# Patient Record
Sex: FEMALE | Race: OTHER | HISPANIC OR LATINO | URBAN - METROPOLITAN AREA
[De-identification: names, ages, dates, MRNs, and addresses within clinical notes are randomized per-mention and may not be internally consistent; named-entity substitution may affect disease eponyms.]

---

## 2017-07-29 ENCOUNTER — OUTPATIENT (OUTPATIENT)
Dept: OUTPATIENT SERVICES | Facility: HOSPITAL | Age: 82
LOS: 1 days | End: 2017-07-29
Payer: MEDICARE

## 2017-07-29 PROCEDURE — 70544 MR ANGIOGRAPHY HEAD W/O DYE: CPT

## 2017-07-29 PROCEDURE — 70544 MR ANGIOGRAPHY HEAD W/O DYE: CPT | Mod: 26

## 2017-08-17 ENCOUNTER — APPOINTMENT (OUTPATIENT)
Dept: NEUROSURGERY | Facility: CLINIC | Age: 82
End: 2017-08-17
Payer: MEDICARE

## 2017-08-17 VITALS
BODY MASS INDEX: 22.71 KG/M2 | WEIGHT: 133 LBS | HEIGHT: 64 IN | OXYGEN SATURATION: 98 % | SYSTOLIC BLOOD PRESSURE: 148 MMHG | DIASTOLIC BLOOD PRESSURE: 82 MMHG | HEART RATE: 79 BPM

## 2017-08-17 PROCEDURE — 99215 OFFICE O/P EST HI 40 MIN: CPT

## 2017-09-13 ENCOUNTER — APPOINTMENT (OUTPATIENT)
Dept: NEUROLOGY | Facility: CLINIC | Age: 82
End: 2017-09-13
Payer: MEDICARE

## 2017-09-13 VITALS
DIASTOLIC BLOOD PRESSURE: 75 MMHG | TEMPERATURE: 99 F | HEIGHT: 64 IN | OXYGEN SATURATION: 98 % | SYSTOLIC BLOOD PRESSURE: 153 MMHG | BODY MASS INDEX: 21.85 KG/M2 | HEART RATE: 72 BPM | WEIGHT: 128 LBS

## 2017-09-13 DIAGNOSIS — Z84.0 FAMILY HISTORY OF DISEASES OF THE SKIN AND SUBCUTANEOUS TISSUE: ICD-10-CM

## 2017-09-13 DIAGNOSIS — Z82.0 FAMILY HISTORY OF EPILEPSY AND OTHER DISEASES OF THE NERVOUS SYSTEM: ICD-10-CM

## 2017-09-13 PROCEDURE — 99205 OFFICE O/P NEW HI 60 MIN: CPT

## 2017-09-14 LAB — ERYTHROCYTE [SEDIMENTATION RATE] IN BLOOD BY WESTERGREN METHOD: 13 MM/HR

## 2017-09-27 ENCOUNTER — APPOINTMENT (OUTPATIENT)
Dept: NEUROLOGY | Facility: CLINIC | Age: 82
End: 2017-09-27
Payer: MEDICARE

## 2017-09-27 VITALS
HEART RATE: 74 BPM | SYSTOLIC BLOOD PRESSURE: 139 MMHG | DIASTOLIC BLOOD PRESSURE: 78 MMHG | BODY MASS INDEX: 21.85 KG/M2 | WEIGHT: 128 LBS | OXYGEN SATURATION: 100 % | HEIGHT: 64 IN

## 2017-09-27 DIAGNOSIS — R51 HEADACHE: ICD-10-CM

## 2017-09-27 PROCEDURE — 99214 OFFICE O/P EST MOD 30 MIN: CPT

## 2017-09-27 RX ORDER — VERAPAMIL HYDROCHLORIDE 120 MG/1
120 TABLET ORAL DAILY
Qty: 30 | Refills: 3 | Status: DISCONTINUED | COMMUNITY
Start: 2017-09-13 | End: 2017-09-27

## 2017-09-27 RX ORDER — PREDNISONE 20 MG/1
20 TABLET ORAL
Qty: 25 | Refills: 0 | Status: DISCONTINUED | COMMUNITY
Start: 2017-09-13 | End: 2017-09-27

## 2017-10-25 ENCOUNTER — APPOINTMENT (OUTPATIENT)
Dept: NEUROLOGY | Facility: CLINIC | Age: 82
End: 2017-10-25
Payer: MEDICARE

## 2017-10-25 VITALS
HEIGHT: 64 IN | TEMPERATURE: 99.9 F | DIASTOLIC BLOOD PRESSURE: 84 MMHG | WEIGHT: 128 LBS | OXYGEN SATURATION: 99 % | SYSTOLIC BLOOD PRESSURE: 155 MMHG | HEART RATE: 82 BPM | BODY MASS INDEX: 21.85 KG/M2

## 2017-10-25 DIAGNOSIS — G43.909 MIGRAINE, UNSPECIFIED, NOT INTRACTABLE, W/OUT STATUS MIGRAINOSUS: ICD-10-CM

## 2017-10-25 PROCEDURE — 99214 OFFICE O/P EST MOD 30 MIN: CPT

## 2017-10-25 RX ORDER — VERAPAMIL HYDROCHLORIDE 80 MG/1
80 TABLET ORAL DAILY
Qty: 30 | Refills: 3 | Status: DISCONTINUED | COMMUNITY
Start: 2017-09-27 | End: 2017-10-25

## 2017-12-18 ENCOUNTER — APPOINTMENT (OUTPATIENT)
Dept: NEUROLOGY | Facility: CLINIC | Age: 82
End: 2017-12-18

## 2018-07-25 PROBLEM — R51 HEADACHE, UNSPECIFIED HEADACHE TYPE: Status: ACTIVE | Noted: 2017-09-13

## 2019-07-18 ENCOUNTER — APPOINTMENT (OUTPATIENT)
Dept: MRI IMAGING | Facility: HOSPITAL | Age: 84
End: 2019-07-18
Payer: MEDICARE

## 2019-07-18 ENCOUNTER — OUTPATIENT (OUTPATIENT)
Dept: OUTPATIENT SERVICES | Facility: HOSPITAL | Age: 84
LOS: 1 days | End: 2019-07-18
Payer: MEDICARE

## 2019-07-18 PROCEDURE — 70544 MR ANGIOGRAPHY HEAD W/O DYE: CPT

## 2019-07-18 PROCEDURE — 70544 MR ANGIOGRAPHY HEAD W/O DYE: CPT | Mod: 26

## 2019-07-19 ENCOUNTER — INPATIENT (INPATIENT)
Facility: HOSPITAL | Age: 84
LOS: 3 days | Discharge: HOME HEALTH | DRG: 84 | End: 2019-07-23
Attending: STUDENT IN AN ORGANIZED HEALTH CARE EDUCATION/TRAINING PROGRAM | Admitting: STUDENT IN AN ORGANIZED HEALTH CARE EDUCATION/TRAINING PROGRAM
Payer: MEDICARE

## 2019-07-19 VITALS
HEIGHT: 63 IN | RESPIRATION RATE: 18 BRPM | WEIGHT: 115.08 LBS | SYSTOLIC BLOOD PRESSURE: 205 MMHG | TEMPERATURE: 99 F | HEART RATE: 77 BPM | DIASTOLIC BLOOD PRESSURE: 80 MMHG | OXYGEN SATURATION: 98 %

## 2019-07-19 LAB
ALBUMIN SERPL ELPH-MCNC: 4.7 G/DL — SIGNIFICANT CHANGE UP (ref 3.3–5)
ALP SERPL-CCNC: 83 U/L — SIGNIFICANT CHANGE UP (ref 40–120)
ALT FLD-CCNC: 13 U/L — SIGNIFICANT CHANGE UP (ref 10–45)
ANION GAP SERPL CALC-SCNC: 11 MMOL/L — SIGNIFICANT CHANGE UP (ref 5–17)
APTT BLD: 25.6 SEC — LOW (ref 27.5–36.3)
AST SERPL-CCNC: 20 U/L — SIGNIFICANT CHANGE UP (ref 10–40)
BASOPHILS # BLD AUTO: 0.06 K/UL — SIGNIFICANT CHANGE UP (ref 0–0.2)
BASOPHILS NFR BLD AUTO: 0.7 % — SIGNIFICANT CHANGE UP (ref 0–2)
BILIRUB SERPL-MCNC: 0.3 MG/DL — SIGNIFICANT CHANGE UP (ref 0.2–1.2)
BLD GP AB SCN SERPL QL: NEGATIVE — SIGNIFICANT CHANGE UP
BUN SERPL-MCNC: 22 MG/DL — SIGNIFICANT CHANGE UP (ref 7–23)
CALCIUM SERPL-MCNC: 9.8 MG/DL — SIGNIFICANT CHANGE UP (ref 8.4–10.5)
CHLORIDE SERPL-SCNC: 107 MMOL/L — SIGNIFICANT CHANGE UP (ref 96–108)
CO2 SERPL-SCNC: 26 MMOL/L — SIGNIFICANT CHANGE UP (ref 22–31)
CREAT SERPL-MCNC: 0.85 MG/DL — SIGNIFICANT CHANGE UP (ref 0.5–1.3)
EOSINOPHIL # BLD AUTO: 0.17 K/UL — SIGNIFICANT CHANGE UP (ref 0–0.5)
EOSINOPHIL NFR BLD AUTO: 2 % — SIGNIFICANT CHANGE UP (ref 0–6)
GLUCOSE SERPL-MCNC: 97 MG/DL — SIGNIFICANT CHANGE UP (ref 70–99)
HCT VFR BLD CALC: 39.7 % — SIGNIFICANT CHANGE UP (ref 34.5–45)
HGB BLD-MCNC: 12.5 G/DL — SIGNIFICANT CHANGE UP (ref 11.5–15.5)
IMM GRANULOCYTES NFR BLD AUTO: 0.2 % — SIGNIFICANT CHANGE UP (ref 0–1.5)
INR BLD: 1.03 — SIGNIFICANT CHANGE UP (ref 0.88–1.16)
LYMPHOCYTES # BLD AUTO: 3.21 K/UL — SIGNIFICANT CHANGE UP (ref 1–3.3)
LYMPHOCYTES # BLD AUTO: 37.2 % — SIGNIFICANT CHANGE UP (ref 13–44)
MCHC RBC-ENTMCNC: 30.4 PG — SIGNIFICANT CHANGE UP (ref 27–34)
MCHC RBC-ENTMCNC: 31.5 GM/DL — LOW (ref 32–36)
MCV RBC AUTO: 96.6 FL — SIGNIFICANT CHANGE UP (ref 80–100)
MONOCYTES # BLD AUTO: 0.87 K/UL — SIGNIFICANT CHANGE UP (ref 0–0.9)
MONOCYTES NFR BLD AUTO: 10.1 % — SIGNIFICANT CHANGE UP (ref 2–14)
NEUTROPHILS # BLD AUTO: 4.31 K/UL — SIGNIFICANT CHANGE UP (ref 1.8–7.4)
NEUTROPHILS NFR BLD AUTO: 49.8 % — SIGNIFICANT CHANGE UP (ref 43–77)
NRBC # BLD: 0 /100 WBCS — SIGNIFICANT CHANGE UP (ref 0–0)
PLATELET # BLD AUTO: 220 K/UL — SIGNIFICANT CHANGE UP (ref 150–400)
POTASSIUM SERPL-MCNC: 4.2 MMOL/L — SIGNIFICANT CHANGE UP (ref 3.5–5.3)
POTASSIUM SERPL-SCNC: 4.2 MMOL/L — SIGNIFICANT CHANGE UP (ref 3.5–5.3)
PROT SERPL-MCNC: 7.8 G/DL — SIGNIFICANT CHANGE UP (ref 6–8.3)
PROTHROM AB SERPL-ACNC: 11.7 SEC — SIGNIFICANT CHANGE UP (ref 10–12.9)
RBC # BLD: 4.11 M/UL — SIGNIFICANT CHANGE UP (ref 3.8–5.2)
RBC # FLD: 14.2 % — SIGNIFICANT CHANGE UP (ref 10.3–14.5)
RH IG SCN BLD-IMP: POSITIVE — SIGNIFICANT CHANGE UP
SODIUM SERPL-SCNC: 144 MMOL/L — SIGNIFICANT CHANGE UP (ref 135–145)
WBC # BLD: 8.64 K/UL — SIGNIFICANT CHANGE UP (ref 3.8–10.5)
WBC # FLD AUTO: 8.64 K/UL — SIGNIFICANT CHANGE UP (ref 3.8–10.5)

## 2019-07-19 PROCEDURE — 70450 CT HEAD/BRAIN W/O DYE: CPT | Mod: 26

## 2019-07-19 PROCEDURE — 71045 X-RAY EXAM CHEST 1 VIEW: CPT | Mod: 26

## 2019-07-19 PROCEDURE — 99285 EMERGENCY DEPT VISIT HI MDM: CPT

## 2019-07-19 PROCEDURE — 93010 ELECTROCARDIOGRAM REPORT: CPT

## 2019-07-19 PROCEDURE — 99222 1ST HOSP IP/OBS MODERATE 55: CPT

## 2019-07-19 RX ORDER — HYDRALAZINE HCL 50 MG
10 TABLET ORAL ONCE
Refills: 0 | Status: COMPLETED | OUTPATIENT
Start: 2019-07-19 | End: 2019-07-19

## 2019-07-19 RX ORDER — DOCUSATE SODIUM 100 MG
100 CAPSULE ORAL THREE TIMES A DAY
Refills: 0 | Status: DISCONTINUED | OUTPATIENT
Start: 2019-07-19 | End: 2019-07-23

## 2019-07-19 RX ORDER — SENNA PLUS 8.6 MG/1
2 TABLET ORAL AT BEDTIME
Refills: 0 | Status: DISCONTINUED | OUTPATIENT
Start: 2019-07-19 | End: 2019-07-23

## 2019-07-19 RX ORDER — ACETAMINOPHEN 500 MG
650 TABLET ORAL EVERY 6 HOURS
Refills: 0 | Status: DISCONTINUED | OUTPATIENT
Start: 2019-07-19 | End: 2019-07-23

## 2019-07-19 RX ADMIN — Medication 10 MILLIGRAM(S): at 23:58

## 2019-07-19 RX ADMIN — Medication 1 MILLIGRAM(S): at 18:11

## 2019-07-19 NOTE — ED PROVIDER NOTE - OBJECTIVE STATEMENT
83 y/o F with PMHx of cerebral aneurysms status post coiling in remote past. Followed by Dr. Cruz and now sent in to ER after routine outpatient MRI showed bilateral subdurals yesterday. Patient reports syncopal episode early June 2019, unclear if patient hit head, and has had persistent global headaches ever since. Denies nausea, vomiting, neck pain and other injury. Patient not on blood thinners. Patient does report intermittent right eye tearing x several weeks and 2 days of intermittent right hand tingling. Patient also reports having difficulty with memory intermittently x 1 week. Today could not remember prayer in [employer]. No chest pain, SOB and no history of HTN.

## 2019-07-19 NOTE — CONSULT NOTE ADULT - SUBJECTIVE AND OBJECTIVE BOX
HISTORY OF PRESENT ILLNESS:   85 y/o F with PMHx of cerebral aneurysms (followed by neurosurgeon Dr. Romero) presents to ER after outpatient MRA revealed bilateral subdural hematomas, however stable cerebral aneurysms. Per patient's daughter, Pt fell 3 weeks ago at home with LOC, however Pt refused to seek medical attention. Since the fall, Pt reports generalized headaches, forgetfulness and gait instability. Pt denies nausea, vomiting, acute weakness/loss of sensation of extremities, dysarthria, dysphagia, urinary/bowel incontinence, neck pain, fever.    PAST MEDICAL & SURGICAL HISTORY:  Intracranial aneurysm  No significant past surgical history    FAMILY HISTORY:    SOCIAL HISTORY:  Tobacco Use: Denies   EtOH use: Denies   Substance: Denies    Allergies    iodine (Anaphylaxis)  Naprosyn (Anaphylaxis)  penicillins (Anaphylaxis)    Intolerances    REVIEW OF SYSTEMS  See HPI    MEDICATIONS:  Antibiotics:    Neuro:    Anticoagulation:    OTHER:    IVF:    Vital Signs Last 24 Hrs  T(C): 37.2 (19 Jul 2019 20:20), Max: 37.2 (19 Jul 2019 17:13)  T(F): 98.9 (19 Jul 2019 20:20), Max: 98.9 (19 Jul 2019 17:13)  HR: 76 (19 Jul 2019 20:20) (75 - 77)  BP: 167/90 (19 Jul 2019 20:20) (158/70 - 205/80)  BP(mean): --  RR: 18 (19 Jul 2019 20:20) (18 - 18)  SpO2: 99% (19 Jul 2019 20:20) (98% - 99%)    PHYSICAL EXAM:  Constitutional: NAD, resting comfortably  Eyes: Sclera anicteric, non-hemorrhagic  Neck: Supple, nontender  Respiratory: CTA B/L  Cardiovascular: RRR, +S1/S2  Gastrointestinal: Abdomen soft, NT/ND  Neurological: AAOx3, FC, speech coherent  CNII-XII: EOM intact, PERRL, face symmetric, tongue midline  Motor: MAEx4 5/5 UE and LE B/L  SILT throughout    LABS:                        12.5   8.64  )-----------( 220      ( 19 Jul 2019 17:44 )             39.7     07-19    144  |  107  |  22  ----------------------------<  97  4.2   |  26  |  0.85    Ca    9.8      19 Jul 2019 17:44    TPro  7.8  /  Alb  4.7  /  TBili  0.3  /  DBili  x   /  AST  20  /  ALT  13  /  AlkPhos  83  07-19    PT/INR - ( 19 Jul 2019 17:44 )   PT: 11.7 sec;   INR: 1.03          PTT - ( 19 Jul 2019 17:44 )  PTT:25.6 sec    CULTURES:    RADIOLOGY & ADDITIONAL STUDIES:  CTH:  Acute/subacute subdural hematomas as above. No midline shift   or central herniation

## 2019-07-19 NOTE — ED ADULT NURSE NOTE - CHPI ED NUR SYMPTOMS NEG
no nausea/no numbness/no loss of consciousness/no fever/no dizziness/no change in level of consciousness/no vomiting

## 2019-07-19 NOTE — H&P ADULT - ASSESSMENT
85 y/o F with PMHx of cerebral aneurysms, found to have acute/subacute bilateral SDH without midline shift or central herniation    PLAN:  -Obtain repeat CTH tomorrow  -DVT prophylaxis: SCDs  -Bowel regimen  -Consult neurology for gait instability  -Consult medicine for syncopal workup  -Normotension BP goal  -PT/OOB  -Dispo pending  -D/w Dr. Pelaez 85 y/o F with PMHx of cerebral aneurysms, found to have acute/subacute bilateral SDH without midline shift or central herniation    PLAN:  -Obtain repeat CTH tomorrow  -Obtain full spine MRI w/o contrast (cervical/thoracic/lumbar)  -DVT prophylaxis: SCDs  -Bowel regimen  -Consult neurology for gait instability  -Consult medicine for syncopal workup  -Normotension BP goal  -PT/OOB  -Dispo pending  -D/w Dr. Pelaez

## 2019-07-19 NOTE — H&P ADULT - NSHPPHYSICALEXAM_GEN_ALL_CORE
Constitutional: NAD, resting comfortably  Eyes: Sclera anicteric, non-hemorrhagic  Neck: Supple, nontender  Respiratory: CTA B/L  Cardiovascular: RRR, +S1/S2  Gastrointestinal: Abdomen soft, NT/ND  Neurological: AAOx3, FC, speech coherent  CNII-XII: EOM intact, PERRL, face symmetric, tongue midline  Motor: MAEx4 5/5 UE and LE B/L  SILT throughout

## 2019-07-19 NOTE — ED PROVIDER NOTE - CLINICAL SUMMARY MEDICAL DECISION MAKING FREE TEXT BOX
85 y/o F with PMHx of cerebral aneurysms sent from neurosurgeon for evaluation of new bilateral frontal subdurals diagnosed on routine MRI yesterday. Patient has chronic intermit symptoms including headaches, paresthesias, cognitive issues, all of which don't appear to be acutely related to pt elevated BP. These symptoms may be related to new subdural hematomas. Plan basic labs, CT brain, EKG, BP monitoring and neurosurgical consultation.

## 2019-07-19 NOTE — CONSULT NOTE ADULT - ASSESSMENT
83 y/o F with PMHx of cerebral aneurysms, found to have acute/subacute bilateral SDH without midline shift or central herniation    PLAN:  -No neurosurgical intervention warranted at this time  -Recommend neurology evaluation  -Recommend veeg   -D/w Dr. Pelaez

## 2019-07-19 NOTE — H&P ADULT - HISTORY OF PRESENT ILLNESS
83 y/o F with PMHx of cerebral aneurysms (followed by neurosurgeon Dr. Romero) presents to ER after outpatient MRA revealed bilateral subdural hematomas, however stable cerebral aneurysms. Per patient's daughter, Pt fell 3 weeks ago at home with LOC, however Pt refused to seek medical attention. Since the fall, Pt reports generalized headaches, forgetfulness and gait instability. Pt denies nausea, vomiting, acute weakness/loss of sensation of extremities, dysarthria, dysphagia, urinary/bowel incontinence, neck pain, fever.

## 2019-07-19 NOTE — ED ADULT TRIAGE NOTE - CHIEF COMPLAINT QUOTE
Patient has Hx. of brain aneurysym 2016, states passed out 3 weeks ago, c/o of memory loss and headaches. Had MRA yesterday and confirmed presence of blood clot.  Patient a/oX 3 at triage, w/ elevated /80 , states right eye blurry vision w/ dizziness and headache.  EKG in progress.

## 2019-07-19 NOTE — ED ADULT NURSE NOTE - OBJECTIVE STATEMENT
pt received sitting in bed in stable condition, A&O x 3. hx colon CA, s/p resection, brain aneurysm. per pt family, pt had outpatient MRI yesterday and was referred to ED for abnormal finding. pt endorses HA worsening with exertion, and Rt eye blurred vision. per family, pt has had intermittent episodes of confusion since 6/5/2019. pt denies n/v/d,fever. NAD noted at this time, will continue to monitor. pt received sitting in bed in stable condition, A&O x 3. hx colon CA, s/p resection, brain aneurysm, smoker hx . per pt family, pt had outpatient MRI yesterday and was referred to ED for abnormal finding. pt endorses HA worsening with exertion, and Rt eye blurred vision. per family, pt has had intermittent episodes of confusion since 6/5/2019. pt denies n/v/d,fever. NAD noted at this time, will continue to monitor.

## 2019-07-19 NOTE — ED ADULT NURSE NOTE - NSIMPLEMENTINTERV_GEN_ALL_ED
Implemented All Fall Risk Interventions:  Independence to call system. Call bell, personal items and telephone within reach. Instruct patient to call for assistance. Room bathroom lighting operational. Non-slip footwear when patient is off stretcher. Physically safe environment: no spills, clutter or unnecessary equipment. Stretcher in lowest position, wheels locked, appropriate side rails in place. Provide visual cue, wrist band, yellow gown, etc. Monitor gait and stability. Monitor for mental status changes and reorient to person, place, and time. Review medications for side effects contributing to fall risk. Reinforce activity limits and safety measures with patient and family.

## 2019-07-20 LAB
ANION GAP SERPL CALC-SCNC: 10 MMOL/L — SIGNIFICANT CHANGE UP (ref 5–17)
BUN SERPL-MCNC: 16 MG/DL — SIGNIFICANT CHANGE UP (ref 7–23)
CALCIUM SERPL-MCNC: 9.2 MG/DL — SIGNIFICANT CHANGE UP (ref 8.4–10.5)
CHLORIDE SERPL-SCNC: 105 MMOL/L — SIGNIFICANT CHANGE UP (ref 96–108)
CK MB CFR SERPL CALC: 2.3 NG/ML — SIGNIFICANT CHANGE UP (ref 0–6.7)
CK SERPL-CCNC: 176 U/L — HIGH (ref 25–170)
CO2 SERPL-SCNC: 24 MMOL/L — SIGNIFICANT CHANGE UP (ref 22–31)
CREAT SERPL-MCNC: 0.74 MG/DL — SIGNIFICANT CHANGE UP (ref 0.5–1.3)
GLUCOSE SERPL-MCNC: 92 MG/DL — SIGNIFICANT CHANGE UP (ref 70–99)
HBA1C BLD-MCNC: 5.2 % — SIGNIFICANT CHANGE UP (ref 4–5.6)
HCT VFR BLD CALC: 39.6 % — SIGNIFICANT CHANGE UP (ref 34.5–45)
HGB BLD-MCNC: 12.9 G/DL — SIGNIFICANT CHANGE UP (ref 11.5–15.5)
MAGNESIUM SERPL-MCNC: 2 MG/DL — SIGNIFICANT CHANGE UP (ref 1.6–2.6)
MCHC RBC-ENTMCNC: 30.9 PG — SIGNIFICANT CHANGE UP (ref 27–34)
MCHC RBC-ENTMCNC: 32.6 GM/DL — SIGNIFICANT CHANGE UP (ref 32–36)
MCV RBC AUTO: 95 FL — SIGNIFICANT CHANGE UP (ref 80–100)
NRBC # BLD: 0 /100 WBCS — SIGNIFICANT CHANGE UP (ref 0–0)
PHOSPHATE SERPL-MCNC: 3.6 MG/DL — SIGNIFICANT CHANGE UP (ref 2.5–4.5)
PLATELET # BLD AUTO: 209 K/UL — SIGNIFICANT CHANGE UP (ref 150–400)
POTASSIUM SERPL-MCNC: 4.3 MMOL/L — SIGNIFICANT CHANGE UP (ref 3.5–5.3)
POTASSIUM SERPL-SCNC: 4.3 MMOL/L — SIGNIFICANT CHANGE UP (ref 3.5–5.3)
RBC # BLD: 4.17 M/UL — SIGNIFICANT CHANGE UP (ref 3.8–5.2)
RBC # FLD: 14.1 % — SIGNIFICANT CHANGE UP (ref 10.3–14.5)
SODIUM SERPL-SCNC: 139 MMOL/L — SIGNIFICANT CHANGE UP (ref 135–145)
TROPONIN T SERPL-MCNC: <0.01 NG/ML — SIGNIFICANT CHANGE UP (ref 0–0.01)
WBC # BLD: 6.89 K/UL — SIGNIFICANT CHANGE UP (ref 3.8–10.5)
WBC # FLD AUTO: 6.89 K/UL — SIGNIFICANT CHANGE UP (ref 3.8–10.5)

## 2019-07-20 PROCEDURE — 99232 SBSQ HOSP IP/OBS MODERATE 35: CPT

## 2019-07-20 PROCEDURE — 72141 MRI NECK SPINE W/O DYE: CPT | Mod: 26

## 2019-07-20 PROCEDURE — 72148 MRI LUMBAR SPINE W/O DYE: CPT | Mod: 26

## 2019-07-20 PROCEDURE — 99223 1ST HOSP IP/OBS HIGH 75: CPT | Mod: GC

## 2019-07-20 PROCEDURE — 70450 CT HEAD/BRAIN W/O DYE: CPT | Mod: 26

## 2019-07-20 PROCEDURE — 72146 MRI CHEST SPINE W/O DYE: CPT | Mod: 26

## 2019-07-20 RX ORDER — TRAMADOL HYDROCHLORIDE 50 MG/1
50 TABLET ORAL EVERY 6 HOURS
Refills: 0 | Status: DISCONTINUED | OUTPATIENT
Start: 2019-07-20 | End: 2019-07-20

## 2019-07-20 RX ORDER — TRAMADOL HYDROCHLORIDE 50 MG/1
25 TABLET ORAL EVERY 6 HOURS
Refills: 0 | Status: DISCONTINUED | OUTPATIENT
Start: 2019-07-20 | End: 2019-07-20

## 2019-07-20 RX ORDER — TRAMADOL HYDROCHLORIDE 50 MG/1
50 TABLET ORAL EVERY 6 HOURS
Refills: 0 | Status: DISCONTINUED | OUTPATIENT
Start: 2019-07-20 | End: 2019-07-23

## 2019-07-20 RX ADMIN — TRAMADOL HYDROCHLORIDE 50 MILLIGRAM(S): 50 TABLET ORAL at 12:55

## 2019-07-20 RX ADMIN — TRAMADOL HYDROCHLORIDE 50 MILLIGRAM(S): 50 TABLET ORAL at 20:32

## 2019-07-20 RX ADMIN — Medication 100 MILLIGRAM(S): at 05:58

## 2019-07-20 RX ADMIN — TRAMADOL HYDROCHLORIDE 50 MILLIGRAM(S): 50 TABLET ORAL at 21:00

## 2019-07-20 RX ADMIN — Medication 650 MILLIGRAM(S): at 23:53

## 2019-07-20 RX ADMIN — TRAMADOL HYDROCHLORIDE 50 MILLIGRAM(S): 50 TABLET ORAL at 13:52

## 2019-07-20 RX ADMIN — Medication 100 MILLIGRAM(S): at 13:52

## 2019-07-20 NOTE — OCCUPATIONAL THERAPY INITIAL EVALUATION ADULT - VISUAL ASSESSMENT: TRACKING
difficulty following visual target despite repetition/reformulation of instructions and visual/tactile cues; ended being able to track in all planes with decreased smoothness of pursuits/down gaze/up gaze/right to left/left to right

## 2019-07-20 NOTE — OCCUPATIONAL THERAPY INITIAL EVALUATION ADULT - PLANNED THERAPY INTERVENTIONS, OT EVAL
cognitive, visual perceptual/transfer training/balance training/motor coordination training/IADL retraining/neuromuscular re-education/parent/caregiver training.../ADL retraining/bed mobility training/strengthening

## 2019-07-20 NOTE — CHART NOTE - NSCHARTNOTEFT_GEN_A_CORE
Neurosurgical Indications for Screening Dopplers on Admission:       1) Known hypercoagulation disorder (h/o VTE, thrombophilia, HIT, etc.)   2) Admitted from prolonged stay from another institution (straight forward ER transfers not included)  3) Presenting with significant leg immobility   4) Presenting with signs and symptoms of VTE?    5) With significant critical illness, Including "found down" for unknown period of time in HPI  6) With significant neurotrauma (TBI, SCI / TLS spine fractures)   7) Who are comatose   8) With known malignancy (e.g. glioblastoma multiforme, meningioma, etc.). Excludes IA chemo 23hr observation stays  9) On hemodialysis   10) Who have received platelet transfusion or antithrombotic reversal agents recently   11) Who have had recent major orthopedic surgery          Screening dopplers indicated?   Y _   N _x    DVT Prophylaxis:  _x SCD's   _ chemoprophylaxis

## 2019-07-20 NOTE — OCCUPATIONAL THERAPY INITIAL EVALUATION ADULT - GENERAL OBSERVATIONS, REHAB EVAL
Patient cleared for Occupational Therapy and OOB activities by neurosurgery ROLANDO Beatty. Right hand dominant. Patient cleared for Occupational Therapy and OOB activities by neurosurgery ROLANDO Beatty and RN Martín, patient received  in non-acute distress. Patient received supine in non-acute distress, family present. +Tele, +IV heplock, + bilateral sequential compression devices, + bed alarm. Patient denies pain at rest, reports occasional right HA with activity and dizziness/nausea. (Patient premedicated for pain and nausea prior to session)

## 2019-07-20 NOTE — OCCUPATIONAL THERAPY INITIAL EVALUATION ADULT - PERTINENT HX OF CURRENT PROBLEM, REHAB EVAL
83 y/o F with PMHx of cerebral aneurysms (followed by neurosurgeon Dr. Romero) presents to ER after outpatient MRA revealed bilateral subdural hematomas, however stable cerebral aneurysms. Per patient's daughter, Pt fell 3 weeks ago at home with LOC, however Pt refused to seek medical attention. Since the fall, Pt reports generalized headaches, forgetfulness and gait instability.

## 2019-07-20 NOTE — OCCUPATIONAL THERAPY INITIAL EVALUATION ADULT - MANUAL MUSCLE TESTING RESULTS, REHAB EVAL
Smile symmetrical, tongue protrusion in midline, cheeks puff and eyebrows elevation grossly intact; bilateral upper extremities grossly 4+/5 throughout

## 2019-07-20 NOTE — OCCUPATIONAL THERAPY INITIAL EVALUATION ADULT - ADDITIONAL COMMENTS
Wears reading glasses. Reports independence with all functional mobility and Activities of Daily Living PTA without AD.

## 2019-07-20 NOTE — OCCUPATIONAL THERAPY INITIAL EVALUATION ADULT - NS ASR FOLLOW COMMAND OT EVAL
able to follow single-step instructions/aphasia/oral apraxia/100% of the time/*Noted with occasional oromotor dyskinesia when focusing on specific tasks, nsx team aware

## 2019-07-20 NOTE — PROGRESS NOTE ADULT - SUBJECTIVE AND OBJECTIVE BOX
HPI:  85 y/o F with PMHx of cerebral aneurysms (followed by neurosurgeon Dr. Romero) presents to ER after outpatient MRA revealed bilateral subdural hematomas, however stable cerebral aneurysms. Per patient's daughter, Pt fell 3 weeks ago at home with LOC, however Pt refused to seek medical attention. Since the fall, Pt reports generalized headaches, forgetfulness and gait instability. Pt denies nausea, vomiting, acute weakness/loss of sensation of extremities, dysarthria, dysphagia, urinary/bowel incontinence, neck pain, fever. (19 Jul 2019 22:08)    OVERNIGHT EVENTS:  Vital Signs Last 24 Hrs  T(C): 35.9 (20 Jul 2019 05:10), Max: 37.2 (19 Jul 2019 17:13)  T(F): 96.7 (20 Jul 2019 05:10), Max: 98.9 (19 Jul 2019 17:13)  HR: 82 (20 Jul 2019 05:10) (62 - 82)  BP: 133/67 (20 Jul 2019 05:10) (133/67 - 205/80)  BP(mean): --  RR: 15 (20 Jul 2019 05:10) (15 - 20)  SpO2: 100% (20 Jul 2019 05:10) (98% - 100%)    I&O's Summary      PHYSICAL EXAM:  Constitutional: NAD, resting comfortably  	Eyes: Sclera anicteric, non-hemorrhagic  	Neck: Supple, nontender  	Respiratory: CTA B/L  	Cardiovascular: RRR, +S1/S2  	Gastrointestinal: Abdomen soft, NT/ND  	Neurological: AAOx3, FC, speech coherent  	CNII-XII: EOM intact, PERRL, face symmetric, tongue midline  	Motor: MAEx4 5/5 UE and LE B/L  SILT throughout      DIET:  [] NPO  [x] Mechanical  [] Tube feeds    LABS:                        12.5   8.64  )-----------( 220      ( 19 Jul 2019 17:44 )             39.7     07-19    144  |  107  |  22  ----------------------------<  97  4.2   |  26  |  0.85    Ca    9.8      19 Jul 2019 17:44    TPro  7.8  /  Alb  4.7  /  TBili  0.3  /  DBili  x   /  AST  20  /  ALT  13  /  AlkPhos  83  07-19    PT/INR - ( 19 Jul 2019 17:44 )   PT: 11.7 sec;   INR: 1.03          PTT - ( 19 Jul 2019 17:44 )  PTT:25.6 sec    CARDIAC MARKERS ( 20 Jul 2019 01:59 )  x     / <0.01 ng/mL / 176 U/L / x     / 2.3 ng/mL      CAPILLARY BLOOD GLUCOSE          Drug Levels: [] N/A    CSF Analysis: [] N/A      Allergies    iodine (Anaphylaxis)  Naprosyn (Anaphylaxis)  penicillins (Anaphylaxis)    Intolerances      MEDICATIONS:  Antibiotics:    Neuro:  acetaminophen   Tablet .. 650 milliGRAM(s) Oral every 6 hours PRN    Anticoagulation:    OTHER:  docusate sodium 100 milliGRAM(s) Oral three times a day  senna 2 Tablet(s) Oral at bedtime PRN    IVF:    CULTURES:    RADIOLOGY & ADDITIONAL TESTS:      ASSESSMENT:  84y Female s/p falls with small bilateral subdural hematomas    SUBDURAL HEMATOMA  Handoff  MEWS Score  Intracranial aneurysm  Subdural hematoma  No significant past surgical history  No significant past surgical history  ABNORMAL LABS  90+      PLAN:  -repeat CT today  -MRI C/T/L spine r/o cord compression as source of gait instability  -f/u neuro consult  -neuro checks  -DVT/GI ppx  -D/W Dr. Pelaez    Assessment:  Present when checked    []  GCS  E   V  M     Heart Failure: []Acute, [] acute on chronic , []chronic  Heart Failure:  [] Diastolic (HFpEF), [] Systolic (HFrEF), []Combined (HFpEF and HFrEF), [] RHF, [] Pulm HTN, [] Other    [] SHONA, [] ATN, [] AIN, [] other  [] CKD1, [] CKD2, [] CKD 3, [] CKD 4, [] CKD 5, []ESRD    Encephalopathy: [] Metabolic, [] Hepatic, [] toxic, [] Neurological, [] Other    Abnormal Nurtitional Status: [] malnurtition (see nutrition note), [ ]underweight: BMI < 19, [] morbid obesity: BMI >40, [] Cachexia    [] Sepsis  [] hypovolemic shock,[] cardiogenic shock, [] hemorrhagic shock, [] neuogenic shock  [] Acute Respiratory Failure  []Cerebral edema, [] Brain compression/ herniation,   [] Functional quadriplegia  [] Acute blood loss anemia

## 2019-07-20 NOTE — CONSULT NOTE ADULT - SUBJECTIVE AND OBJECTIVE BOX
Patient is a 84y old  Female who presents with a chief complaint of B/L SDH (19 Jul 2019 22:08)      HPI:  85 y/o F with PMHx of cerebral aneurysms (followed by neurosurgeon Dr. Romero) presents to ER after outpatient MRA revealed bilateral subdural hematomas, however stable cerebral aneurysms. Per patient's daughter, Pt fell 3 weeks ago at home with LOC, however Pt refused to seek medical attention. Since the fall, Pt reports generalized headaches, forgetfulness and gait instability. Pt denies nausea, vomiting, acute weakness/loss of sensation of extremities, dysarthria, dysphagia, urinary/bowel incontinence, neck pain, fever. (19 Jul 2019 22:08)      REVIEW OF SYSTEMS: see HPI    PAST MEDICAL HISTORY: Intracranial aneurysm    PAST SURGICAL HISTORY: No significant surgery    FAMILY HISTORY:    SOCIAL HISTORY:  Tobacco use: denies  EtOH use: denies  Illicit drug use: denies    MEDICATIONS:  MEDICATIONS  (STANDING):  docusate sodium 100 milliGRAM(s) Oral three times a day    MEDICATIONS  (PRN):  acetaminophen   Tablet .. 650 milliGRAM(s) Oral every 6 hours PRN Temp greater or equal to 38C (100.4F), Mild Pain (1 - 3)  senna 2 Tablet(s) Oral at bedtime PRN Constipation      ALLERGIES:  Allergies  iodine (Anaphylaxis)  Naprosyn (Anaphylaxis)  penicillins (Anaphylaxis)    Intolerances        VITAL SIGNS:  Vital Signs Last 24 Hrs  T(C): 35.9 (20 Jul 2019 05:10), Max: 37.2 (19 Jul 2019 17:13)  T(F): 96.7 (20 Jul 2019 05:10), Max: 98.9 (19 Jul 2019 17:13)  HR: 82 (20 Jul 2019 05:10) (62 - 82)  BP: 133/67 (20 Jul 2019 05:10) (133/67 - 205/80)  BP(mean): --  RR: 15 (20 Jul 2019 05:10) (15 - 20)  SpO2: 100% (20 Jul 2019 05:10) (98% - 100%)      PHYSICAL EXAM:  Constitutional: WDWN resting comfortably in bed; NAD  Head: NC/AT  Eyes: PERRL, EOMI, anicteric sclera  ENT: no nasal discharge; uvula midline, no oropharyngeal erythema or exudates; MMM  Neck: supple; no JVD or thyromegaly  Respiratory: CTA B/L; no W/R/R, no retractions  Cardiac: +S1/S2; RRR; no M/R/G; PMI non-displaced  Gastrointestinal: abdomen soft, NT/ND; no rebound or guarding; +BSx4  Genitourinary: normal external genitalia  Back: spine midline, no bony tenderness or step-offs; no CVAT B/L  Extremities: WWP, no clubbing or cyanosis; no peripheral edema  Musculoskeletal: NROM x4; no joint swelling, tenderness or erythema  Vascular: 2+ radial, femoral, DP/PT pulses B/L  Dermatologic: skin warm, dry and intact; no rashes, wounds, or scars  Lymphatic: no submandibular or cervical LAD  Neurologic: AAOx3; CNII-XII grossly intact; no focal deficits  Psychiatric: affect and characteristics of appearance, verbalizations, behaviors are appropriate    LABS:                        12.5   8.64  )-----------( 220      ( 19 Jul 2019 17:44 )             39.7     07-19    144  |  107  |  22  ----------------------------<  97  4.2   |  26  |  0.85    Ca    9.8      19 Jul 2019 17:44    TPro  7.8  /  Alb  4.7  /  TBili  0.3  /  DBili  x   /  AST  20  /  ALT  13  /  AlkPhos  83  07-19    PT/INR - ( 19 Jul 2019 17:44 )   PT: 11.7 sec;   INR: 1.03          PTT - ( 19 Jul 2019 17:44 )  PTT:25.6 sec    CARDIAC MARKERS ( 20 Jul 2019 01:59 )  x     / <0.01 ng/mL / 176 U/L / x     / 2.3 ng/mL      CAPILLARY BLOOD GLUCOSE              RADIOLOGY & ADDITIONAL TESTS: Reviewed. Patient is a 84y old  Female who presents with a chief complaint of B/L SDH (19 Jul 2019 22:08)      HPI:  83 y/o F with PMHx of cerebral aneurysms (followed by neurosurgeon Dr. Romero) presents to ER after outpatient MRA revealed bilateral subdural hematomas, however stable cerebral aneurysms. Per patient's daughter, Pt fell 3 weeks ago at home with LOC, however Pt refused to seek medical attention. Since the fall, Pt reports generalized headaches, forgetfulness and gait instability. Pt denies nausea, vomiting, acute weakness/loss of sensation of extremities, dysarthria, dysphagia, urinary/bowel incontinence, neck pain, fever. (19 Jul 2019 22:08)    Syncopal event occurred 7/9/19. She states it was morning and does not remember any preceding symptoms prior to the event. It has never happened before. She woke up and her daughter was standing over her. She denies dizziness, palpitations, CP, nausea/vomiting, SOB, loss of bowel or bladder incontinence post event confusion or sleepiness.       REVIEW OF SYSTEMS: see HPI    PAST MEDICAL HISTORY: Intracranial aneurysm    PAST SURGICAL HISTORY: No significant surgery    FAMILY HISTORY:    SOCIAL HISTORY:  Tobacco use: denies  EtOH use: denies  Illicit drug use: denies    MEDICATIONS:  MEDICATIONS  (STANDING):  docusate sodium 100 milliGRAM(s) Oral three times a day    MEDICATIONS  (PRN):  acetaminophen   Tablet .. 650 milliGRAM(s) Oral every 6 hours PRN Temp greater or equal to 38C (100.4F), Mild Pain (1 - 3)  senna 2 Tablet(s) Oral at bedtime PRN Constipation      ALLERGIES:  Allergies  iodine (Anaphylaxis)  Naprosyn (Anaphylaxis)  penicillins (Anaphylaxis)    Intolerances        VITAL SIGNS:  Vital Signs Last 24 Hrs  T(C): 35.9 (20 Jul 2019 05:10), Max: 37.2 (19 Jul 2019 17:13)  T(F): 96.7 (20 Jul 2019 05:10), Max: 98.9 (19 Jul 2019 17:13)  HR: 82 (20 Jul 2019 05:10) (62 - 82)  BP: 133/67 (20 Jul 2019 05:10) (133/67 - 205/80)  BP(mean): --  RR: 15 (20 Jul 2019 05:10) (15 - 20)  SpO2: 100% (20 Jul 2019 05:10) (98% - 100%)      PHYSICAL EXAM:  Constitutional: WDWN resting comfortably in bed; NAD  Head: NC/AT  Eyes: PERRL, EOMI, anicteric sclera  ENT: no nasal discharge; uvula midline, no oropharyngeal erythema or exudates; MMM  Neck: supple; no JVD or thyromegaly  Respiratory: CTA B/L; no W/R/R, no retractions  Cardiac: +S1/S2; RRR; II/VI SEJM RUSB  Gastrointestinal: abdomen soft, NT/ND; no rebound or guarding; +BSx4  Back: spine midline, no bony tenderness or step-offs; no CVAT B/L  Extremities: WWP, no clubbing or cyanosis; no peripheral edema  Musculoskeletal: NROM x4; no joint swelling, tenderness or erythema  Vascular: 2+ radial, femoral, DP/PT pulses B/L  Dermatologic: skin warm, dry and intact; no rashes, wounds, or scars  Lymphatic: no submandibular or cervical LAD  Neurologic: CNII-XII grossly intact; no focal deficits      LABS:                        12.5   8.64  )-----------( 220      ( 19 Jul 2019 17:44 )             39.7     07-19    144  |  107  |  22  ----------------------------<  97  4.2   |  26  |  0.85    Ca    9.8      19 Jul 2019 17:44    TPro  7.8  /  Alb  4.7  /  TBili  0.3  /  DBili  x   /  AST  20  /  ALT  13  /  AlkPhos  83  07-19    PT/INR - ( 19 Jul 2019 17:44 )   PT: 11.7 sec;   INR: 1.03          PTT - ( 19 Jul 2019 17:44 )  PTT:25.6 sec    CARDIAC MARKERS ( 20 Jul 2019 01:59 )  x     / <0.01 ng/mL / 176 U/L / x     / 2.3 ng/mL      CAPILLARY BLOOD GLUCOSE              RADIOLOGY & ADDITIONAL TESTS: Reviewed.

## 2019-07-20 NOTE — CONSULT NOTE ADULT - ASSESSMENT
#Syncope  -Admission ECG-12 NSR with no ischemic changes; would recommend continuous telemetry monitoring for at least 24h to monitor for arrythmia  -    #Subdural Hematoma  #Hypertension #Syncope  -Admission ECG-12 NSR with no ischemic changes; would recommend continuous telemetry monitoring for at least 24h to monitor for arrythmia as description of event being sudden is consistent more with arrythmia, CVA or MI  -Since ECG without signs of ischemic changes can rule out MI  -Recommend Echocardiogram to evaluate for Aortic Stenosis as murmur was heard on exam  -Less likely to be Seizure given there were no post-ictal states  -No preceding dizziness or light headed feeling makes orthostatics less likely  -Patient denies any vasovagal like symptoms but still remains on differential    #Subdural Hematoma  -Management as per NeuroSurg team    #Hypertension  -Would hold treatment at this time as pressures are labile and could be related to ICH  -Given history of falls and Isolated systolic hypertension likely patient would not benefit from pharmacologic therapy at this time  -Continue to monitor    Medicine Consult will continue to follow #Syncope  -Admission ECG-12 NSR with no ischemic changes; would recommend continuous telemetry monitoring for at least 24h to monitor for arrythmia as description of event being sudden is consistent more with arrythmia, CVA or MI  -Since ECG without signs of ischemic changes can rule out MI  -Recommend Echocardiogram to evaluate for Aortic Stenosis as murmur was heard on exam  -Less likely to be Seizure given there were no post-ictal states  -No preceding dizziness or light headed feeling makes orthostatics less likely but would recommend documenting orthostatic vitals in the chart.   -Patient denies any vasovagal like symptoms but still remains on differential    #Subdural Hematoma  -Management as per NeuroSurg team    #Hypertension  -Would hold treatment at this time as pressures are labile and could be related to ICH  -Given history of falls and Isolated systolic hypertension likely patient would not benefit from pharmacologic therapy at this time  -Continue to monitor    Medicine Consult will continue to follow #Syncope  -Admission ECG-12 NSR with no ischemic changes; would recommend continuous telemetry monitoring for at least 24h to monitor for arrythmia as description of event being sudden is consistent more with arrythmia, CVA or MI  -Since ECG without signs of ischemic changes can rule out MI  -Recommend Echocardiogram to evaluate for Aortic Stenosis as murmur was heard on exam  -Patient with confusion following syncope, recommend vEEG  -No preceding dizziness or light headed feeling makes orthostatics less likely but would recommend documenting orthostatic vitals in the chart.   -Patient denies any vasovagal like symptoms but still remains on differential    #Subdural Hematoma  -Management as per NeuroSurg team    #Hypertension  -Would hold treatment at this time as pressures are labile and could be related to ICH  -Given history of falls and Isolated systolic hypertension likely patient would not benefit from pharmacologic therapy at this time  -Continue to monitor    Medicine Consult will continue to follow #Syncope  -Admission ECG-12 NSR with no ischemic changes; would recommend continuous telemetry monitoring for at least 24h to monitor for arrythmia as description of event being sudden is consistent more with arrythmia, CVA or MI  -Since ECG without signs of ischemic changes can rule out MI  -Recommend Echocardiogram to evaluate for Aortic Stenosis as murmur was heard on exam  -Patient with confusion following syncope, recommend vEEG  -No preceding dizziness or light headed feeling makes orthostatics less likely but would recommend documenting orthostatic vitals in the chart.   -Patient denies any vasovagal like symptoms but still remains on differential  -Daughter also states confusion after episodes (which happened twice on the same day in June), possibly post-ictal, consider EEG to r/o seizures    #Subdural Hematoma  -Management as per NeuroSurg team        Medicine Consult will continue to follow

## 2019-07-20 NOTE — OCCUPATIONAL THERAPY INITIAL EVALUATION ADULT - STANDING BALANCE: DYNAMIC, REHAB EVAL
ambulated 25 feet total to/from bathroom with RW and minAX1, impaired balance with 1 LOB while turning requiring min/modAX1 to recover; limited by patient's c/o dizziness/nausea/poor plus

## 2019-07-20 NOTE — ED ADULT NURSE REASSESSMENT NOTE - NS ED NURSE REASSESS COMMENT FT1
SBP 152mmHg. Administered IV antihypertensive for SBP greater than 150mmHg as per admission resident instructions.

## 2019-07-21 LAB
ANION GAP SERPL CALC-SCNC: 9 MMOL/L — SIGNIFICANT CHANGE UP (ref 5–17)
BUN SERPL-MCNC: 17 MG/DL — SIGNIFICANT CHANGE UP (ref 7–23)
CALCIUM SERPL-MCNC: 9.2 MG/DL — SIGNIFICANT CHANGE UP (ref 8.4–10.5)
CHLORIDE SERPL-SCNC: 104 MMOL/L — SIGNIFICANT CHANGE UP (ref 96–108)
CO2 SERPL-SCNC: 24 MMOL/L — SIGNIFICANT CHANGE UP (ref 22–31)
CREAT SERPL-MCNC: 0.76 MG/DL — SIGNIFICANT CHANGE UP (ref 0.5–1.3)
GLUCOSE SERPL-MCNC: 98 MG/DL — SIGNIFICANT CHANGE UP (ref 70–99)
HCT VFR BLD CALC: 41.5 % — SIGNIFICANT CHANGE UP (ref 34.5–45)
HGB BLD-MCNC: 13.3 G/DL — SIGNIFICANT CHANGE UP (ref 11.5–15.5)
MAGNESIUM SERPL-MCNC: 2 MG/DL — SIGNIFICANT CHANGE UP (ref 1.6–2.6)
MCHC RBC-ENTMCNC: 30.9 PG — SIGNIFICANT CHANGE UP (ref 27–34)
MCHC RBC-ENTMCNC: 32 GM/DL — SIGNIFICANT CHANGE UP (ref 32–36)
MCV RBC AUTO: 96.3 FL — SIGNIFICANT CHANGE UP (ref 80–100)
NRBC # BLD: 0 /100 WBCS — SIGNIFICANT CHANGE UP (ref 0–0)
PHOSPHATE SERPL-MCNC: 3.9 MG/DL — SIGNIFICANT CHANGE UP (ref 2.5–4.5)
PLATELET # BLD AUTO: 212 K/UL — SIGNIFICANT CHANGE UP (ref 150–400)
POTASSIUM SERPL-MCNC: 4.3 MMOL/L — SIGNIFICANT CHANGE UP (ref 3.5–5.3)
POTASSIUM SERPL-SCNC: 4.3 MMOL/L — SIGNIFICANT CHANGE UP (ref 3.5–5.3)
RBC # BLD: 4.31 M/UL — SIGNIFICANT CHANGE UP (ref 3.8–5.2)
RBC # FLD: 13.9 % — SIGNIFICANT CHANGE UP (ref 10.3–14.5)
SODIUM SERPL-SCNC: 137 MMOL/L — SIGNIFICANT CHANGE UP (ref 135–145)
WBC # BLD: 7.47 K/UL — SIGNIFICANT CHANGE UP (ref 3.8–10.5)
WBC # FLD AUTO: 7.47 K/UL — SIGNIFICANT CHANGE UP (ref 3.8–10.5)

## 2019-07-21 PROCEDURE — 99223 1ST HOSP IP/OBS HIGH 75: CPT | Mod: 24

## 2019-07-21 PROCEDURE — 95951: CPT | Mod: 26,52

## 2019-07-21 PROCEDURE — 99233 SBSQ HOSP IP/OBS HIGH 50: CPT | Mod: GC

## 2019-07-21 RX ORDER — ONDANSETRON 8 MG/1
4 TABLET, FILM COATED ORAL EVERY 6 HOURS
Refills: 0 | Status: DISCONTINUED | OUTPATIENT
Start: 2019-07-21 | End: 2019-07-23

## 2019-07-21 RX ADMIN — ONDANSETRON 4 MILLIGRAM(S): 8 TABLET, FILM COATED ORAL at 11:17

## 2019-07-21 RX ADMIN — TRAMADOL HYDROCHLORIDE 50 MILLIGRAM(S): 50 TABLET ORAL at 10:40

## 2019-07-21 RX ADMIN — Medication 650 MILLIGRAM(S): at 00:30

## 2019-07-21 RX ADMIN — Medication 100 MILLIGRAM(S): at 13:19

## 2019-07-21 RX ADMIN — Medication 1 CAPSULE(S): at 13:19

## 2019-07-21 RX ADMIN — Medication 1 CAPSULE(S): at 21:31

## 2019-07-21 RX ADMIN — Medication 1 CAPSULE(S): at 22:00

## 2019-07-21 NOTE — CONSULT NOTE ADULT - SUBJECTIVE AND OBJECTIVE BOX
HPI:  85 y/o F with PMHx of cerebral aneurysms (followed by neurosurgeon Dr. Romero) presents to ER after outpatient MRA revealed bilateral subdural hematomas, however stable cerebral aneurysms. Per patient's daughter, Pt fell 3 weeks ago, apparently out of bed and onto the floor. A fall was heard by daughter; patient does not remember falling. She remembers awakening w/ confusion and becoming clear within a short time period.  Pt refused to seek medical attention. Since the fall, Pt reports generalized headaches, forgetfulness and gait instability. Pt denies nausea, vomiting, acute weakness/loss of sensation of extremities, dysarthria, dysphagia, urinary/bowel incontinence, neck pain, fever. (19 Jul 2019 22:08). Her biggest complaint is HA.     PAST MEDICAL & SURGICAL HISTORY:  Intracranial aneurysm  No significant past surgical history    Home Medications:  none  tylenol prn    REVIEW OF SYSTEMS    General: no fever, malaise    Ophthalmologic:  no visual acuity changes  	  ENMT:	no changes    Respiratory and Thorax:  No chest pain, SOB  	  Cardiovascular:	no palpitations    Gastrointestinal:	 no pain, constipation    Genitourinary:  no dysuria    Musculoskeletal:	 no myalgias    Neurological:  as above    Hematology/Lymphatics:	 no bleeding    Endocrine:  no frequent urination    Vital Signs Last 24 Hrs  T(C): 37.2 (21 Jul 2019 09:39), Max: 37.3 (21 Jul 2019 05:07)  T(F): 99 (21 Jul 2019 09:39), Max: 99.2 (21 Jul 2019 05:07)  HR: 70 (21 Jul 2019 08:14) (64 - 88)  BP: 157/70 (21 Jul 2019 08:14) (148/60 - 161/72)  BP(mean): 100 (21 Jul 2019 08:14) (95 - 109)  RR: 16 (21 Jul 2019 08:14) (16 - 19)  SpO2: 97% (21 Jul 2019 08:14) (96% - 98%)    PHYSICAL EXAM:  NEURO EXAM    MS: Patient is awake, alert, fully oriented;   CN:  pupils 2-3mm equal and briskly reactive to light, EOMs intact  MOTOR: muscle strength 5/5 on all 4 extremities  COORD:  FTN intact, HTS intact      LABS:                        13.3   7.47  )-----------( 212      ( 21 Jul 2019 06:30 )             41.5     07-21    137  |  104  |  17  ----------------------------<  98  4.3   |  24  |  0.76    Ca    9.2      21 Jul 2019 06:30  Phos  3.9     07-21  Mg     2.0     07-21    TPro  7.8  /  Alb  4.7  /  TBili  0.3  /  DBili  x   /  AST  20  /  ALT  13  /  AlkPhos  83  07-19    PT/INR - ( 19 Jul 2019 17:44 )   PT: 11.7 sec;   INR: 1.03          PTT - ( 19 Jul 2019 17:44 )  PTT:25.6 sec    CARDIAC MARKERS ( 20 Jul 2019 01:59 )  x     / <0.01 ng/mL / 176 U/L / x     / 2.3 ng/mL      CAPILLARY BLOOD GLUCOSE

## 2019-07-21 NOTE — PROGRESS NOTE ADULT - SUBJECTIVE AND OBJECTIVE BOX
HPI:  85 y/o F with PMHx of cerebral aneurysms (followed by neurosurgeon Dr. Romero) presents to ER after outpatient MRA revealed bilateral subdural hematomas, however stable cerebral aneurysms. Per patient's daughter, Pt fell 3 weeks ago at home with LOC, however Pt refused to seek medical attention. Since the fall, Pt reports generalized headaches, forgetfulness and gait instability. Pt denies nausea, vomiting, acute weakness/loss of sensation of extremities, dysarthria, dysphagia, urinary/bowel incontinence, neck pain, fever. (19 Jul 2019 22:08)    OVERNIGHT EVENTS:  No events overnight. Pt c/o HA.  7/20 CTH x2 stable  7/21 VEEG ordered for sycope workup.  7/22 Echo for syncope workup.    Vital Signs Last 24 Hrs  T(C): 37.3 (21 Jul 2019 05:07), Max: 37.3 (21 Jul 2019 05:07)  T(F): 99.2 (21 Jul 2019 05:07), Max: 99.2 (21 Jul 2019 05:07)  HR: 70 (21 Jul 2019 08:14) (64 - 88)  BP: 157/70 (21 Jul 2019 08:14) (148/60 - 161/72)  BP(mean): 100 (21 Jul 2019 08:14) (95 - 109)  RR: 16 (21 Jul 2019 08:14) (16 - 19)  SpO2: 97% (21 Jul 2019 08:14) (96% - 98%)    I&O's Summary    20 Jul 2019 07:01  -  21 Jul 2019 07:00  --------------------------------------------------------  IN: 400 mL / OUT: 650 mL / NET: -250 mL        PHYSICAL EXAM:  Neurological:  AxOx3  doesn't appear confused today  no pronator drift  5/5 motor x 4ex     TUBES/LINES:  [] Carbajal  [] Lumbar Drain  [] Wound Drains  [] Others      DIET:  [] NPO  [x] Mechanical  [] Tube feeds    LABS:                        13.3   7.47  )-----------( 212      ( 21 Jul 2019 06:30 )             41.5     07-21    137  |  104  |  17  ----------------------------<  98  4.3   |  24  |  0.76    Ca    9.2      21 Jul 2019 06:30  Phos  3.9     07-21  Mg     2.0     07-21    TPro  7.8  /  Alb  4.7  /  TBili  0.3  /  DBili  x   /  AST  20  /  ALT  13  /  AlkPhos  83  07-19    PT/INR - ( 19 Jul 2019 17:44 )   PT: 11.7 sec;   INR: 1.03          PTT - ( 19 Jul 2019 17:44 )  PTT:25.6 sec    CARDIAC MARKERS ( 20 Jul 2019 01:59 )  x     / <0.01 ng/mL / 176 U/L / x     / 2.3 ng/mL      CAPILLARY BLOOD GLUCOSE          Drug Levels: [] N/A    CSF Analysis: [] N/A      Allergies    iodine (Anaphylaxis)  Naprosyn (Anaphylaxis)  penicillins (Anaphylaxis)    Intolerances      MEDICATIONS:  Antibiotics:    Neuro:  acetaminophen   Tablet .. 650 milliGRAM(s) Oral every 6 hours PRN  traMADol 50 milliGRAM(s) Oral every 6 hours PRN    Anticoagulation:    OTHER:  docusate sodium 100 milliGRAM(s) Oral three times a day  senna 2 Tablet(s) Oral at bedtime PRN    IVF:    CULTURES:    RADIOLOGY & ADDITIONAL TESTS:  < from: CT Head No Cont (07.20.19 @ 16:14) >  Stable exam. No change in bilateral subcentimeter frontoparietal subdural   hematomas.      < end of copied text >      ASSESSMENT:  84y Female w/b/l tiny acute subacute SDH, stable on repeat    SUBDURAL HEMATOMA  Handoff  MEWS Score  Intracranial aneurysm  Subdural hematoma  No significant past surgical history  No significant past surgical history  ABNORMAL LABS  90+      PLAN:  NEURO:  -neurochecks  -syncope workup. VEEG ordered.  -echo ordered  -BP control  -CTH x 2 stable. Plan for conservative management at this point.   -MRI C/T/L done.  to review.   -no chemoprophylaxis due to brain hemorrahge  -D/W     DVT PROPHYLAXIS:  [x] Venodynes                                [] Heparin/Lovenox    DISPOSITION: home after syncope workup complete

## 2019-07-21 NOTE — CONSULT NOTE ADULT - ASSESSMENT
ASSESSMENT:  84F w/ falls and HA, neuro exam relatively unremarkable. May have fallen at home and had concussion and SDH. Seizures could have been a pre-existing condition.    PLAN:  NEURO: q 2 neuro checks  start vEEG monitoring  hold AEDs for now  consider epilepsy consult    PULM:  Room air, incentive spirometry    CARDIO:  SBP goal 100-150mm Hg    ENDO:  Blood sugar goals 140-180 mg/dL, continue insulin sliding scale    GI: ADAT    FEN: IVL    RENAL:  stable    HEME:  stable    ID: afebrile, no leukocytosis    Prophylaxis:  VTE/PE  start Lovenox if ok w/ Nsg [x] SCDs   Rehab:    [x] PT/OT  [ ] Cog eval  Social: will update family    Dispo:  [x ] Step-down [ ] Pan  Code Status:  [ x ] Full

## 2019-07-21 NOTE — PROGRESS NOTE ADULT - SUBJECTIVE AND OBJECTIVE BOX
Patient is a 84y old  Female who presents with a chief complaint of B/L SDH (21 Jul 2019 13:26)      INTERVAL HPI/OVERNIGHT EVENTS: C/o HA.     Review of Systems: 12 point review of systems otherwise negative      MEDICATIONS  (STANDING):  docusate sodium 100 milliGRAM(s) Oral three times a day    MEDICATIONS  (PRN):  acetaminophen   Tablet .. 650 milliGRAM(s) Oral every 6 hours PRN Temp greater or equal to 38C (100.4F), Mild Pain (1 - 3)  acetaminophen 300 mG/butalbital 50 mG/ caffeine 40 mG 1 Capsule(s) Oral every 6 hours PRN HA  ondansetron Injectable 4 milliGRAM(s) IV Push every 6 hours PRN Nausea  senna 2 Tablet(s) Oral at bedtime PRN Constipation  traMADol 50 milliGRAM(s) Oral every 6 hours PRN Severe Pain (7 - 10)      Allergies    iodine (Anaphylaxis)  Naprosyn (Anaphylaxis)  penicillins (Anaphylaxis)    Intolerances          Vital Signs Last 24 Hrs  T(C): 37.2 (21 Jul 2019 14:00), Max: 37.3 (21 Jul 2019 05:07)  T(F): 99 (21 Jul 2019 14:00), Max: 99.2 (21 Jul 2019 05:07)  HR: 72 (21 Jul 2019 12:08) (64 - 80)  BP: 160/67 (21 Jul 2019 12:08) (148/60 - 161/72)  BP(mean): 96 (21 Jul 2019 12:08) (95 - 107)  RR: 16 (21 Jul 2019 12:03) (16 - 19)  SpO2: 96% (21 Jul 2019 12:08) (96% - 99%)  CAPILLARY BLOOD GLUCOSE          07-20 @ 07:01  -  07-21 @ 07:00  --------------------------------------------------------  IN: 400 mL / OUT: 650 mL / NET: -250 mL    07-21 @ 07:01 - 07-21 @ 15:32  --------------------------------------------------------  IN: 360 mL / OUT: 0 mL / NET: 360 mL        Physical Exam:    Daily     Daily   General:  Well appearing, NAD, not cachetic  HEENT:  Nonicteric, PERRLA  CV:  RRR, no murmur, no JVD  Lungs:  CTA B/L, no wheezes, rales, rhonchi  Abdomen:  Soft, non-tender, no distended, positive BS, no hepatosplenomegaly  Extremities:  2+ pulses, no c/c, no edema  Skin:  Warm and dry, no rashes  :  No guzmán  Neuro:  AAOx3, non-focal, CN II-XII grossly intact  No Restraints    LABS:                        13.3   7.47  )-----------( 212      ( 21 Jul 2019 06:30 )             41.5     07-21    137  |  104  |  17  ----------------------------<  98  4.3   |  24  |  0.76    Ca    9.2      21 Jul 2019 06:30  Phos  3.9     07-21  Mg     2.0     07-21    TPro  7.8  /  Alb  4.7  /  TBili  0.3  /  DBili  x   /  AST  20  /  ALT  13  /  AlkPhos  83  07-19    PT/INR - ( 19 Jul 2019 17:44 )   PT: 11.7 sec;   INR: 1.03          PTT - ( 19 Jul 2019 17:44 )  PTT:25.6 sec        RADIOLOGY & ADDITIONAL TESTS:    ---------------------------------------------------------------------------  I personally reviewed: [  ]EKG   [  ]CXR    [  ] CT    [  ]Other  ---------------------------------------------------------------------------  PLEASE CHECK WHEN PRESENT:     [  ]Heart Failure     [  ] Acute     [  ] Acute on Chronic     [  ] Chronic  -------------------------------------------------------------------     [  ]Diastolic [HFpEF]     [  ]Systolic [HFrEF]     [  ]Combined [HFpEF & HFrEF]     [  ]Other:  -------------------------------------------------------------------  [  ]SHONA     [  ]ATN     [  ]Reneal Medullary Necrosis     [  ]Renal Cortical Necrosis     [  ]Other Pathological Lesions:    [  ]CKD 1  [  ]CKD 2  [  ]CKD 3  [  ]CKD 4  [  ]CKD 5  [  ]Other  -------------------------------------------------------------------  [  ]Other/Unspecified:    --------------------------------------------------------------------    Abdominal Nutritional Status  [  ]Malnutrition: See Nutrition Note  [  ]Cachexia  [  ]Other:   [  ]Supplement Ordered:  [  ]Morbid Obesity (BMI >=40]

## 2019-07-21 NOTE — PROGRESS NOTE ADULT - ASSESSMENT
#Syncope  -Admission ECG-12 NSR with no ischemic changes;   -NSR on tele  -f/u echo  -Patient with confusion following syncope, recommend vEEG  -Daughter also states confusion after episodes (which happened twice on the same day in June), possibly post-ictal, consider EEG to r/o seizures    #Subdural Hematoma  -Management as per NeuroSurg team        Medicine Consult will continue to follow

## 2019-07-22 PROCEDURE — 99233 SBSQ HOSP IP/OBS HIGH 50: CPT | Mod: 24

## 2019-07-22 PROCEDURE — 93306 TTE W/DOPPLER COMPLETE: CPT | Mod: 26

## 2019-07-22 PROCEDURE — 99233 SBSQ HOSP IP/OBS HIGH 50: CPT | Mod: GC

## 2019-07-22 PROCEDURE — 99232 SBSQ HOSP IP/OBS MODERATE 35: CPT

## 2019-07-22 PROCEDURE — 99221 1ST HOSP IP/OBS SF/LOW 40: CPT | Mod: GC

## 2019-07-22 RX ORDER — ENOXAPARIN SODIUM 100 MG/ML
40 INJECTION SUBCUTANEOUS DAILY
Refills: 0 | Status: DISCONTINUED | OUTPATIENT
Start: 2019-07-22 | End: 2019-07-23

## 2019-07-22 RX ADMIN — Medication 650 MILLIGRAM(S): at 13:18

## 2019-07-22 RX ADMIN — Medication 100 MILLIGRAM(S): at 13:45

## 2019-07-22 RX ADMIN — Medication 100 MILLIGRAM(S): at 22:41

## 2019-07-22 RX ADMIN — Medication 650 MILLIGRAM(S): at 12:18

## 2019-07-22 RX ADMIN — TRAMADOL HYDROCHLORIDE 50 MILLIGRAM(S): 50 TABLET ORAL at 10:14

## 2019-07-22 RX ADMIN — ENOXAPARIN SODIUM 40 MILLIGRAM(S): 100 INJECTION SUBCUTANEOUS at 22:41

## 2019-07-22 RX ADMIN — TRAMADOL HYDROCHLORIDE 50 MILLIGRAM(S): 50 TABLET ORAL at 11:30

## 2019-07-22 NOTE — PROGRESS NOTE ADULT - ASSESSMENT
84F with subdural hematoma and concern for syncope. Unclear if patient had syncopal episode resulting in fall or fell out of bed while sleeping. Would complete work up by getting vEEG to evaluate for seizure.       #Syncope  -Admission ECG-12 NSR with no ischemic changes;   -NSR on tele  -f/u echo  -Patient with confusion following syncope, recommend vEEG  -Daughter also states confusion after episodes (which happened twice on the same day in June), possibly post-ictal, consider EEG to r/o seizures    #Subdural Hematoma  -Management as per NeuroSurg team        Medicine Consult will continue to follow

## 2019-07-22 NOTE — PHYSICAL THERAPY INITIAL EVALUATION ADULT - GENERAL OBSERVATIONS, REHAB EVAL
Patient received semi-supine in bed in nAD, +ekg, SCD. Patient with complaints of left sided headache.

## 2019-07-22 NOTE — PHYSICAL THERAPY INITIAL EVALUATION ADULT - GAIT DEVIATIONS NOTED, PT EVAL
ambulated 12 feet to bathroom without device, patient with minor LOB requiring min/mod A to recover and with complaints of dizziness./decreased karol

## 2019-07-22 NOTE — PROGRESS NOTE ADULT - SUBJECTIVE AND OBJECTIVE BOX
O/N Events: NAILA  Subjective/ROS: Patient without any complaints. States today that she had a fall from bed not standing. Denies HA, CP, SOB, n/v, changes in bowel/urinary habits.  12pt ROS otherwise negative.    VITALS  Vital Signs Last 24 Hrs  T(C): 37 (22 Jul 2019 14:00), Max: 37.3 (21 Jul 2019 21:36)  T(F): 98.6 (22 Jul 2019 14:00), Max: 99.1 (21 Jul 2019 21:36)  HR: 76 (22 Jul 2019 12:47) (68 - 88)  BP: 141/66 (22 Jul 2019 12:47) (127/66 - 148/65)  BP(mean): 95 (22 Jul 2019 12:47) (84 - 99)  RR: 18 (22 Jul 2019 12:47) (17 - 20)  SpO2: 95% (22 Jul 2019 12:47) (94% - 97%)    CAPILLARY BLOOD GLUCOSE    PHYSICAL EXAM  Constitutional: WDWN resting comfortably in bed; NAD  Head: NC/AT  Eyes: PERRL, EOMI, anicteric sclera  ENT: no nasal discharge; uvula midline, no oropharyngeal erythema or exudates; MMM  Neck: supple; no JVD or thyromegaly  Respiratory: CTA B/L; no W/R/R, no retractions  Cardiac: +S1/S2; RRR; II/VI SEJM RUSB  Gastrointestinal: abdomen soft, NT/ND; no rebound or guarding; +BSx4  Back: spine midline, no bony tenderness or step-offs; no CVAT B/L  Extremities: WWP, no clubbing or cyanosis; no peripheral edema  Musculoskeletal: NROM x4; no joint swelling, tenderness or erythema  Vascular: 2+ radial, femoral, DP/PT pulses B/L  Dermatologic: skin warm, dry and intact; no rashes, wounds, or scars  Lymphatic: no submandibular or cervical LAD  Neurologic: CNII-XII grossly intact; no focal deficits    MEDICATIONS  (STANDING):  docusate sodium 100 milliGRAM(s) Oral three times a day    MEDICATIONS  (PRN):  acetaminophen   Tablet .. 650 milliGRAM(s) Oral every 6 hours PRN Temp greater or equal to 38C (100.4F), Mild Pain (1 - 3)  acetaminophen 300 mG/butalbital 50 mG/ caffeine 40 mG 1 Capsule(s) Oral every 6 hours PRN HA  ondansetron Injectable 4 milliGRAM(s) IV Push every 6 hours PRN Nausea  senna 2 Tablet(s) Oral at bedtime PRN Constipation  traMADol 50 milliGRAM(s) Oral every 6 hours PRN Severe Pain (7 - 10)      iodine (Anaphylaxis)  Naprosyn (Anaphylaxis)  penicillins (Anaphylaxis)      LABS                        13.3   7.47  )-----------( 212      ( 21 Jul 2019 06:30 )             41.5     07-21    137  |  104  |  17  ----------------------------<  98  4.3   |  24  |  0.76    Ca    9.2      21 Jul 2019 06:30  Phos  3.9     07-21  Mg     2.0     07-21 O/N Events: NAILA  Subjective/ROS: Patient without any complaints. States today that she had a fall from bed not standing. Denies HA, CP, SOB, n/v, changes in bowel/urinary habits.  12pt ROS otherwise negative.    VITALS  Vital Signs Last 24 Hrs  T(C): 37 (22 Jul 2019 14:00), Max: 37.3 (21 Jul 2019 21:36)  T(F): 98.6 (22 Jul 2019 14:00), Max: 99.1 (21 Jul 2019 21:36)  HR: 76 (22 Jul 2019 12:47) (68 - 88)  BP: 141/66 (22 Jul 2019 12:47) (127/66 - 148/65)  BP(mean): 95 (22 Jul 2019 12:47) (84 - 99)  RR: 18 (22 Jul 2019 12:47) (17 - 20)  SpO2: 95% (22 Jul 2019 12:47) (94% - 97%)    CAPILLARY BLOOD GLUCOSE    PHYSICAL EXAM  Constitutional: WDWN resting comfortably in bed; NAD  Head: NC/AT  Eyes: PERRL, EOMI, anicteric sclera  ENT: no nasal discharge; uvula midline, no oropharyngeal erythema or exudates; MMM  Neck: supple; no JVD or thyromegaly  Respiratory: CTA B/L; no W/R/R, no retractions  Cardiac: +S1/S2; RRR; II/VI SEJM RUSB  Gastrointestinal: abdomen soft, NT/ND; no rebound or guarding; +BSx4  Back: spine midline, no bony tenderness or step-offs; no CVAT B/L  Extremities: WWP, no clubbing or cyanosis; no peripheral edema  Musculoskeletal: NROM x4; no joint swelling, tenderness or erythema  Vascular: 2+ radial, femoral, DP/PT pulses B/L  Dermatologic: skin warm, dry and intact; no rashes, wounds, or scars  Lymphatic: no submandibular or cervical LAD  Neurologic: CNII-XII grossly intact; 5/5 str all 4 ext, no dysmetria , no dysdiadochokinesia, no focal deficits    MEDICATIONS  (STANDING):  docusate sodium 100 milliGRAM(s) Oral three times a day    MEDICATIONS  (PRN):  acetaminophen   Tablet .. 650 milliGRAM(s) Oral every 6 hours PRN Temp greater or equal to 38C (100.4F), Mild Pain (1 - 3)  acetaminophen 300 mG/butalbital 50 mG/ caffeine 40 mG 1 Capsule(s) Oral every 6 hours PRN HA  ondansetron Injectable 4 milliGRAM(s) IV Push every 6 hours PRN Nausea  senna 2 Tablet(s) Oral at bedtime PRN Constipation  traMADol 50 milliGRAM(s) Oral every 6 hours PRN Severe Pain (7 - 10)      iodine (Anaphylaxis)  Naprosyn (Anaphylaxis)  penicillins (Anaphylaxis)      LABS                        13.3   7.47  )-----------( 212      ( 21 Jul 2019 06:30 )             41.5     07-21    137  |  104  |  17  ----------------------------<  98  4.3   |  24  |  0.76    Ca    9.2      21 Jul 2019 06:30  Phos  3.9     07-21  Mg     2.0     07-21

## 2019-07-22 NOTE — PROGRESS NOTE ADULT - ASSESSMENT
84y/F with  Intracranial aneurysm      PLAN:   NEURO:  REHAB:  physical therapy evaluation and management    EARLY MOB:      PULM:  Room air, incentive spirometry  CARDIO:  SBP goal 100-150mm Hg  ENDO:  Blood sugar goals 140-180 mg/dL, continue insulin sliding scale  GI:  PPI for GI prophylaxis  DIET:  RENAL:    HEM/ONC:  VTE Prophylaxis:   ID: afebrile, no leukocytosis  Social: will update family 84y/F with  1.  bilatearl frontoparietal subdural hematoma  2.  multiple unruptured intracranial aneurysms (R supraclinoid, R A2, [employer], R distal M1, L M1  3.  syncope    PLAN:   NEURO: neurochecks q4h, PRN pain meds with Tylenol, tramadol  SDH:  no indications for surgical evacuation for now  f/u VEEG - if NEG, d/c  REHAB:  physical therapy evaluation and management    EARLY MOB:  OOB to chair, ambulate / syncope and fall precautions    PULM:  Room air, incentive spirometry  CARDIO:  SBP goal 100-150mm Hg, echo mild conc LVH  ENDO:  Blood sugar goals 140-180 mg/dL, continue insulin sliding scale  GI:  docusate senna  DIET: regular diet  RENAL:  IVL  HEM/ONC: Hb stable  VTE Prophylaxis: SCDs, start SQH if ok with NS  ID: afebrile, no leukocytosis  Social: will update family  DISPO PLANNING    ATTENDING ATTESTATION:  I was physically present for the key portions of the evaluation and management (E/M) service provided.  I agree with the above history, physical and plan which I have reviewed and edited where appropriate.     Patient not at high risk for neurologic deterioration / death.  Time spent on this noncritically ill patient: 45 minutes spent on total encounter, more than 50% of the visit was spent counseling and/or coordinating care by the attending physician.    Plan discussed with RN, house staff.    REVIEW OF SYSTEMS:  No headaches, no nausea or vomiting; 14 -point review of systems otherwise unremarkable.

## 2019-07-22 NOTE — CONSULT NOTE ADULT - ATTENDING COMMENTS
I saw and examined Ms. Acosta with Dr. Flaherty.  I agree with the above note with the following additions/exceptions:    84-year-old woman with known intracranial aneurysm, admitted for management of bilateral traumatic subdural hematomas following a fall.  In addition to history reported above, she reports that she always feels lightheaded upon sitting up in bed and upon standing, particularly first thing in the morning.      On exam, vital signs were all within normal limits, however heart rate increased from 68 to 92 upon standing up from the bed -- this increase was accompanied by subjective lightheadedness and unsteadiness on her feet.      Rest of exam as follows:   Constitutional: no apparent distress  Psychiatric: normal affect, euthyhmic  Ears, Nose, Throat: moist mucous membranes  Neck: supple, no lymphadenopathy  Lungs: clear to auscultation bilaterally  Heart: regular rate and rhythm, normal S1/S2, no murmurs   Abdomen: soft, non-tender, no distended, bowl sounds present  Extremities: warm, well-perfused, no edema  Skin: no rashes or lesions    Neurologic Exam:  Mental Status: awake and alert, speech fluent and prosodic with no paraphasic errors, follows simple and complex commands  Cranial Nerves: I: deferred; II: pupils equal round and reactive; III, IV, VI: extraocular movements full with no nystagmus; V: facial power symmetric; VII: facial sensation intact and symmetric; VIII: hearing intact to finger rub; IX/X: palate elevates symmetrically, no dysarthria; XI: shoulder shrug symmetric; XII: tongue protrudes midline  Motor: normal bulk and tone, no orbiting or pronator drift, power 5/5 to confrontation throughout, no abnormal movements  Sensation: intact to light touch vibration in distal upper and lower extremities bilaterally  Coordination: finger-nose-finger intact bilaterally  Reflexes: 2+ throughout, toes downgoing  Gait: narrow base, unable to assess stride due to lightheadedness    Impression:  84-year-old woman with symptomatic orthostasis leading to lightheadedness and subjective instability upon standing.  She has no neurologic deficits that would affect her gait.    Recommendations:  -Encourage hydration and salt intake if safe from a cardiac perspective  -Compression stockings  -Can consider midodrine or florinef as an outpatient if symptoms persist  -Neurology will sign off at this time, please call us with any additional questions    Latoya Seals MD  Attending Neurologist

## 2019-07-22 NOTE — PROGRESS NOTE ADULT - SUBJECTIVE AND OBJECTIVE BOX
HPI:  85 y/o F with PMHx of cerebral aneurysms (followed by neurosurgeon Dr. Romero) presents to ER after outpatient MRA revealed bilateral subdural hematomas, however stable cerebral aneurysms. Per patient's daughter, Pt fell 3 weeks ago at home with LOC, however Pt refused to seek medical attention. Since the fall, Pt reports generalized headaches, forgetfulness and gait instability. Pt denies nausea, vomiting, acute weakness/loss of sensation of extremities, dysarthria, dysphagia, urinary/bowel incontinence, neck pain, fever. (19 Jul 2019 22:08)    OVERNIGHT EVENTS:  No events overnight. Pt c/o HA.  7/20 CTH x2 stable  7/21 VEEG ordered for sycope workup.  7/22 Echo for syncope workup pending    OVERNIGHT EVENTS:  Vital Signs Last 24 Hrs  T(C): 37.3 (21 Jul 2019 21:36), Max: 37.3 (21 Jul 2019 05:07)  T(F): 99.1 (21 Jul 2019 21:36), Max: 99.2 (21 Jul 2019 05:07)  HR: 72 (21 Jul 2019 23:45) (64 - 88)  BP: 148/65 (21 Jul 2019 23:45) (127/66 - 160/67)  BP(mean): 93 (21 Jul 2019 23:45) (89 - 105)  RR: 20 (21 Jul 2019 23:45) (16 - 20)  SpO2: 95% (21 Jul 2019 23:45) (95% - 99%)    I&O's Summary    20 Jul 2019 07:01  -  21 Jul 2019 07:00  --------------------------------------------------------  IN: 400 mL / OUT: 650 mL / NET: -250 mL    21 Jul 2019 07:01  -  22 Jul 2019 00:21  --------------------------------------------------------  IN: 560 mL / OUT: 650 mL / NET: -90 mL        PHYSICAL EXAM:  Neurological:  AxOx3  EOMI, PERRL  face symmetric  tongue midline  no pronator drift  5/5 motor x 4ex     TUBES/LINES:  [pivs    DIET:  [] NPO  [x] Mechanical  [] Tube feeds    LABS:                        13.3   7.47  )-----------( 212      ( 21 Jul 2019 06:30 )             41.5     07-21    137  |  104  |  17  ----------------------------<  98  4.3   |  24  |  0.76    Ca    9.2      21 Jul 2019 06:30  Phos  3.9     07-21  Mg     2.0     07-21          CARDIAC MARKERS ( 20 Jul 2019 01:59 )  x     / <0.01 ng/mL / 176 U/L / x     / 2.3 ng/mL      CAPILLARY BLOOD GLUCOSE          Drug Levels: [] N/A    CSF Analysis: [] N/A      Allergies    iodine (Anaphylaxis)  Naprosyn (Anaphylaxis)  penicillins (Anaphylaxis)    Intolerances      MEDICATIONS:  Antibiotics:    Neuro:  acetaminophen   Tablet .. 650 milliGRAM(s) Oral every 6 hours PRN  acetaminophen 300 mG/butalbital 50 mG/ caffeine 40 mG 1 Capsule(s) Oral every 6 hours PRN  ondansetron Injectable 4 milliGRAM(s) IV Push every 6 hours PRN  traMADol 50 milliGRAM(s) Oral every 6 hours PRN    Anticoagulation:    OTHER:  docusate sodium 100 milliGRAM(s) Oral three times a day  senna 2 Tablet(s) Oral at bedtime PRN    IVF:    CULTURES:    RADIOLOGY & ADDITIONAL TESTS:      ASSESSMENT:  84y Female w/b/l small acute subacute SDH, stable on repeat    SUBDURAL HEMATOMA  Handoff  MEWS Score  Intracranial aneurysm  Subdural hematoma  No significant past surgical history  No significant past surgical history  ABNORMAL LABS  90+      PLAN:  NEURO:  -neurochecks  -syncope workup. f/u VEEG in progress.  -echo ordered  -BP control  -CTH x 2 stable. Plan for conservative management at this point.   -MRI C/T/L done.  to review.   -no chemoprophylaxis due to brain hemorrahge  -D/W     DVT PROPHYLAXIS:  [x] Venodynes                                [] Heparin/Lovenox    DISPOSITION: home after syncope workup complete

## 2019-07-22 NOTE — CONSULT NOTE ADULT - ASSESSMENT
85 y/o F with PMHx of cerebral aneurysms (followed by neurosurgeon Dr. Romero) presents to ER after outpatient MRA revealed bilateral subdural hematomas, however stable cerebral aneurysms    # Gait instability   - Pt admitted s/p fall, found to have b/l small SDH  - neuro exam non focal, A&Ox3  - pt admits to a long history of orthostatic hypotension  Recommendations  - gait instability likely 2/2 orthostatic hypotension  - no further neuro workup warranted   - rest of management per primary team  - neuro will sign off. Result for further questions    discussed with Dr. Seals 85 y/o F with PMHx of cerebral aneurysms (followed by neurosurgeon Dr. Romero) presents to ER after outpatient MRA revealed bilateral subdural hematomas, however stable cerebral aneurysms    # Gait instability   - Pt admitted s/p fall, found to have b/l small SDH  - neuro exam non focal, A&Ox3  - pt admits to a long history of orthostatic hypotension  - repeat orthostatics + with HR >20 upon sitting to standing  Recommendations  - gait instability likely 2/2 orthostatic hypotension  - no further neuro workup warranted   - rest of management per primary team  - neuro will sign off. Result for further questions    discussed with Dr. Seals

## 2019-07-22 NOTE — PHYSICAL THERAPY INITIAL EVALUATION ADULT - ADDITIONAL COMMENTS
Patient is right handed, lives in 5th floor walk up and uses cane as needed. Patient states she is able to perform ADL independently and does most things with her daughter.    Face symmetric, vision intact.

## 2019-07-22 NOTE — PHYSICAL THERAPY INITIAL EVALUATION ADULT - DIAGNOSIS, PT EVAL
5A: Primary Prevention/Risk Reduction for Loss of Balance and Falling 5D: Impaired Motor Function and Sensory Integrity Associated with Nonprogressive Disorders of the Central Nervous System—Acquired in Adolescence or Adulthood

## 2019-07-22 NOTE — PROGRESS NOTE ADULT - SUBJECTIVE AND OBJECTIVE BOX
=================================  NEUROCRITICAL CARE ATTENDING NOTE  =================================    MITCHELL HINOJOSA   MRN-[id number]  Summary:  84y/F with h/o cerebral aneurysms presented with fall 3 weeks ago with LOC, then had headaches, forgetfulness, gait instability.  Brought to MD where MRA showed bilateral SDH, stable aneurysms, sent to ED, admitted to neurosurgery.     COURSE IN THE HOSPITAL:   Admitted to [facility]    Past Medical History: Intracranial aneurysm  Allergies:  iodine (Anaphylaxis) Naprosyn (Anaphylaxis) penicillins (Anaphylaxis)   Home meds:     PHYSICAL EXAMINATION  T(C): 36.9 ( @ 10:17), Max: 37.3 ( @ 21:36) HR: 80 ( @ 11:10) (68 - 88) BP: 137/65 ( @ 11:10) (127/66 - 148/65) RR: 18 ( @ 08:37) (17 - 20) SpO2: 96% ( @ 11:10) (94% - 97%)  NEUROLOGIC EXAMINATION:  Patient is awake, alert, fully oriented, pupils 2-3mm equal and briskly reactive to light, EOMs intact, muscle strength 5/5 on all 4s.  GENERAL: not intubated, not in cardiorespiratory distress  EENT:  anicteric  CARDIOVASCULAR: (+) S1 S2, normal rate and regular rhythm  PULMONARY: clear to auscultation bilaterally  ABDOMEN: soft, nontender with normoactive bowel sounds  EXTREMITIES: no edema  SKIN: no rash    LABS:                        13.3   7.47  )-----------( 212      ( 2019 06:30 )             41.5     137  |  104  |  17  ----------------------------<  98  4.3   |  24  |  0.76    Ca    9.2      2019 06:30  Phos  3.9       Mg     2.0      @ 07:01  -   @ 07:00  IN: 560 mL / OUT: 650 mL / NET: -90 mL    Bacteriology:  CSF studies:  EEG:  Neuroimagin/20 CT head:  stable exam  Other imaging:    MEDICATIONS:      acetaminophen   Tablet .. 650 milliGRAM(s) Oral every 6 hours PRN  acetaminophen 300 mG/butalbital 50 mG/ caffeine 40 mG 1 Capsule(s) Oral every 6 hours PRN  ondansetron Injectable 4 milliGRAM(s) IV Push every 6 hours PRN  traMADol 50 milliGRAM(s) Oral every 6 hours PRN        docusate sodium 100 milliGRAM(s) Oral three times a day  senna 2 Tablet(s) Oral at bedtime PRN            IV FLUIDS:  DRIPS:  DIET:  Lines:  Drains:    19 @ 07:01  -  19 @ 07:00  --------------------------------------------------------  OUT:    Voided: 650 mL  Total OUT: 650 mL        Wounds:    CODE STATUS:  Full Code                       GOALS OF CARE:  aggressive                      DISPOSITION:  ICU =================================  NEUROCRITICAL CARE ATTENDING NOTE  =================================    MITCHELL HINOJOSA   MRN-[id number]  Summary:  84y/F with h/o cerebral aneurysms presented with fall 3 weeks ago with LOC, then had headaches, forgetfulness, gait instability.  Brought to MD where MRA showed bilateral SDH, stable aneurysms, sent to ED, admitted to neurosurgery.     COURSE IN THE HOSPITAL:   Admitted to [facility]    Past Medical History: Intracranial aneurysm  Allergies:  iodine (Anaphylaxis) Naprosyn (Anaphylaxis) penicillins (Anaphylaxis)   Home meds:     PHYSICAL EXAMINATION  T(C): 36.9 ( @ 10:17), Max: 37.3 ( @ 21:36) HR: 80 ( @ 11:10) (68 - 88) BP: 137/65 ( @ 11:10) (127/66 - 148/65) RR: 18 ( @ 08:37) (17 - 20) SpO2: 96% ( @ 11:10) (94% - 97%)  NEUROLOGIC EXAMINATION:  Patient is awake, alert, fully oriented, pupils 2-3mm equal and briskly reactive to light, EOMs intact, muscle strength 5/5 on all 4s.  GENERAL: not intubated, not in cardiorespiratory distress  EENT:  anicteric  CARDIOVASCULAR: (+) S1 S2, normal rate and regular rhythm  PULMONARY: clear to auscultation bilaterally  ABDOMEN: soft, nontender with normoactive bowel sounds  EXTREMITIES: no edema  SKIN: no rash    LABS:                        13.3   7.47  )-----------( 212      ( 2019 06:30 )             41.5     137  |  104  |  17  ----------------------------<  98  4.3   |  24  |  0.76    Ca    9.2      2019 06:30  Phos  3.9       Mg     2.0      @ 07:01  -   @ 07:00  IN: 560 mL / OUT: 650 mL / NET: -90 mL    Bacteriology:  CSF studies:  EEG:  Neuroimagin/20 CT head:  stable exam   MRA:  inc in size of R supraclinoid IcA aneurysm, incorporating Pcomm, other aneurysms unchanged (RA2, Acomm, R distal M1, L M1.  bilateral frontoparietal SDH  Other imaging:    MEDICATIONS: tramadol PRN docusate senna PRN     IV FLUIDS: IVL  DRIPS:  DIET: regular  Lines:  Drains:    Wounds:    CODE STATUS:  Full Code                       GOALS OF CARE:  aggressive                      DISPOSITION:  8La

## 2019-07-22 NOTE — PHYSICAL THERAPY INITIAL EVALUATION ADULT - FOLLOWS COMMANDS/ANSWERS QUESTIONS, REHAB EVAL
able to follow multistep instructions/able to follow single-step instructions/75% of the time/100% of the time

## 2019-07-22 NOTE — CONSULT NOTE ADULT - SUBJECTIVE AND OBJECTIVE BOX
NEUROLOGY INITIAL CONSULT NOTE    CHIEF COMPLAINT:      HPI:  83 y/o F with PMHx of cerebral aneurysms (followed by neurosurgeon Dr. Romero) presents to ER after outpatient MRA revealed bilateral subdural hematomas, however stable cerebral aneurysms. Per patient's daughter, Pt fell 3 weeks ago at home with LOC, however Pt refused to seek medical attention. Since the fall, pt reports generalized headaches, forgetfulness and gait instability. Neuro consulted to evaluate her gait instability.       PAST MEDICAL & SURGICAL HISTORY:  Intracranial aneurysm  No significant past surgical history      REVIEW OF SYSTEMS:  As per HPI, otherwise negative for Constitutional, Eyes, Ears/Nose/Mouth/Throat, Neck, Cardiovascular, Respiratory, Gastrointestinal, Genitourinary, Skin, Endocrine, Musculoskeletal, Psychiatric, and Hematologic/Lymphatic.    MEDICATIONS  (STANDING):  docusate sodium 100 milliGRAM(s) Oral three times a day  enoxaparin Injectable 40 milliGRAM(s) SubCutaneous daily    MEDICATIONS  (PRN):  acetaminophen   Tablet .. 650 milliGRAM(s) Oral every 6 hours PRN Temp greater or equal to 38C (100.4F), Mild Pain (1 - 3)  acetaminophen 300 mG/butalbital 50 mG/ caffeine 40 mG 1 Capsule(s) Oral every 6 hours PRN HA  ondansetron Injectable 4 milliGRAM(s) IV Push every 6 hours PRN Nausea  senna 2 Tablet(s) Oral at bedtime PRN Constipation  traMADol 50 milliGRAM(s) Oral every 6 hours PRN Severe Pain (7 - 10)      Allergies    iodine (Anaphylaxis)  Naprosyn (Anaphylaxis)  penicillins (Anaphylaxis)    Intolerances        FAMILY HISTORY:      SOCIAL HISTORY:  Living Situation:  Occupation:  Tobacco:  Alcohol:   Drug use:      VITAL SIGNS:  Vital Signs Last 24 Hrs  T(C): 37.2 (22 Jul 2019 17:00), Max: 37.3 (21 Jul 2019 21:36)  T(F): 99 (22 Jul 2019 17:00), Max: 99.1 (21 Jul 2019 21:36)  HR: 84 (22 Jul 2019 16:35) (68 - 88)  BP: 140/74 (22 Jul 2019 16:35) (127/66 - 148/65)  BP(mean): 101 (22 Jul 2019 16:35) (84 - 101)  RR: 16 (22 Jul 2019 16:35) (16 - 20)  SpO2: 96% (22 Jul 2019 16:35) (94% - 97%)    PHYSICAL EXAMINATION:  Constitutional: WDWN; NAD  Eyes: Conjunctiva and sclera clear.  Cardiovascular: Regular rate and rhythm; S1 and S2 Normal; No murmurs, gallops or rubs.  Neurologic:  Orientation: person accurate, date accurate, place accurate.  Language: naming intact. Repetition intact. No paraphasias or neologisms. Fluent with appropriate sentences.  Attention: TABLE forwards intact, backwards intact.  Calculation: 7 quarters in $1.75.  Visual/tactile neglect?: none.  Memory: registration of 3 words intact, repetition at 5 minutes intact.  Right left confusion?: no.  II: Visual fields intact.  III, IV, VI: EOMI. No nystagmus. PERRLA bilaterally.  V: intact to light touch.  VII: smile symmetrical. Eyebrow elevation symmetrical. Eye closure symmetrical. No flattening of nasolabial fold.  VIII: grossly intact/symmetric hearing.  IX, X: palate elevation symmetrical.  XI: sternocleidomastoid 5R/5L, trapezius 5R/5L  XII: no tongue deviation.  Motor: no atrophy or fasciculations. No tremor. No hypo/hyperkinesia. No pronator drift. Tone normal. Finger tap rapid and equal on both sides. Strength: deltoids 5R/5L, biceps 5R/5L, triceps 5R/5L, wrist flexors 5R/5L, wrist extensors 5R/5L,  strong and equal R/L, hip flexors 5R/5L, leg flexors 5R/5L, leg extensors 5R/5L, ankle plantarflexion 5R/5L, ankle dorsiflexion 5R/5L  Reflexes: biceps 2R/2L, triceps 2R/2L, brachioradialis 2R/2L, patellar 2R/2L, ankles 2R/2L. Toes down R/L  Coordination: finger to nose without dysmetria or overshoot R/L. Romberg negative.  Sensory: intact on R and L hemibody to light touch, pin, vibration, proprioception.  Gait: Pt had dizziness upon standing from a seated position.     Orthostatics were positive with an increase in HR >20      LABS:                        13.3   7.47  )-----------( 212      ( 21 Jul 2019 06:30 )             41.5     07-21    137  |  104  |  17  ----------------------------<  98  4.3   |  24  |  0.76    Ca    9.2      21 Jul 2019 06:30  Phos  3.9     07-21  Mg     2.0     07-21            RADIOLOGY & ADDITIONAL STUDIES:      IMPRESSION & RECOMMENDATIONS: NEUROLOGY INITIAL CONSULT NOTE    CHIEF COMPLAINT:      HPI:  83 y/o F with PMHx of cerebral aneurysms (followed by neurosurgeon Dr. Romero) presents to ER after outpatient MRA revealed bilateral subdural hematomas, however stable cerebral aneurysms. Per patient's daughter, Pt fell 3 weeks ago at home with LOC, however Pt refused to seek medical attention. Since the fall, pt reports generalized headaches, forgetfulness and gait instability. Neuro consulted to evaluate her gait instability.       PAST MEDICAL & SURGICAL HISTORY:  Intracranial aneurysm  No significant past surgical history      REVIEW OF SYSTEMS:  As per HPI, otherwise negative for Constitutional, Eyes, Ears/Nose/Mouth/Throat, Neck, Cardiovascular, Respiratory, Gastrointestinal, Genitourinary, Skin, Endocrine, Musculoskeletal, Psychiatric, and Hematologic/Lymphatic.    MEDICATIONS  (STANDING):  docusate sodium 100 milliGRAM(s) Oral three times a day  enoxaparin Injectable 40 milliGRAM(s) SubCutaneous daily    MEDICATIONS  (PRN):  acetaminophen   Tablet .. 650 milliGRAM(s) Oral every 6 hours PRN Temp greater or equal to 38C (100.4F), Mild Pain (1 - 3)  acetaminophen 300 mG/butalbital 50 mG/ caffeine 40 mG 1 Capsule(s) Oral every 6 hours PRN HA  ondansetron Injectable 4 milliGRAM(s) IV Push every 6 hours PRN Nausea  senna 2 Tablet(s) Oral at bedtime PRN Constipation  traMADol 50 milliGRAM(s) Oral every 6 hours PRN Severe Pain (7 - 10)      Allergies    iodine (Anaphylaxis)  Naprosyn (Anaphylaxis)  penicillins (Anaphylaxis)    Intolerances        FAMILY HISTORY:      SOCIAL HISTORY:  Living Situation:  Occupation:  Tobacco:  Alcohol:   Drug use:      VITAL SIGNS:  Vital Signs Last 24 Hrs  T(C): 37.2 (22 Jul 2019 17:00), Max: 37.3 (21 Jul 2019 21:36)  T(F): 99 (22 Jul 2019 17:00), Max: 99.1 (21 Jul 2019 21:36)  HR: 84 (22 Jul 2019 16:35) (68 - 88)  BP: 140/74 (22 Jul 2019 16:35) (127/66 - 148/65)  BP(mean): 101 (22 Jul 2019 16:35) (84 - 101)  RR: 16 (22 Jul 2019 16:35) (16 - 20)  SpO2: 96% (22 Jul 2019 16:35) (94% - 97%)    PHYSICAL EXAMINATION:  Constitutional: WDWN; NAD  Eyes: Conjunctiva and sclera clear.  Cardiovascular: Regular rate and rhythm; S1 and S2 Normal; No murmurs, gallops or rubs.  Neurologic:  Orientation: person accurate, date accurate, place accurate.  Right left confusion?: no.  II: Visual fields intact.  III, IV, VI: EOMI. No nystagmus. PERRLA bilaterally.  V: intact to light touch.  VII: smile symmetrical. Eyebrow elevation symmetrical. Eye closure symmetrical. No flattening of nasolabial fold.  VIII: grossly intact/symmetric hearing.  IX, X: palate elevation symmetrical.  XI: sternocleidomastoid 5R/5L, trapezius 5R/5L  XII: no tongue deviation.  Motor: no atrophy or fasciculations. No tremor. No hypo/hyperkinesia. No pronator drift. Tone normal. Finger tap rapid and equal on both sides. Strength: deltoids 5R/5L, biceps 5R/5L, triceps 5R/5L, wrist flexors 5R/5L, wrist extensors 5R/5L,  strong and equal R/L, hip flexors 5R/5L, leg flexors 5R/5L, leg extensors 5R/5L, ankle plantarflexion 5R/5L, ankle dorsiflexion 5R/5L  Reflexes: biceps 2R/2L, triceps 2R/2L, brachioradialis 2R/2L, patellar 2R/2L, ankles 2R/2L. Toes down R/L  Coordination: finger to nose without dysmetria or overshoot R/L. Romberg negative.  Sensory: intact on R and L hemibody to light touch, pin, vibration, proprioception.  Gait: Pt had dizziness upon standing from a seated position.     Orthostatics were positive with an increase in HR >20      LABS:                        13.3   7.47  )-----------( 212      ( 21 Jul 2019 06:30 )             41.5     07-21    137  |  104  |  17  ----------------------------<  98  4.3   |  24  |  0.76    Ca    9.2      21 Jul 2019 06:30  Phos  3.9     07-21  Mg     2.0     07-21            RADIOLOGY & ADDITIONAL STUDIES:      IMPRESSION & RECOMMENDATIONS: NEUROLOGY INITIAL CONSULT NOTE    CHIEF COMPLAINT:      HPI:  83 y/o F with PMHx of cerebral aneurysms (followed by neurosurgeon Dr. Romero) presents to ER after outpatient MRA revealed bilateral subdural hematomas, however stable cerebral aneurysms. Per patient's daughter, Pt fell 3 weeks ago at home with LOC, however Pt refused to seek medical attention. Since the fall, pt reports generalized headaches, forgetfulness and gait instability. Neuro consulted to evaluate her gait instability.       PAST MEDICAL & SURGICAL HISTORY:  Intracranial aneurysm  No significant past surgical history      REVIEW OF SYSTEMS:  As per HPI, otherwise negative for Constitutional, Eyes, Ears/Nose/Mouth/Throat, Neck, Cardiovascular, Respiratory, Gastrointestinal, Genitourinary, Skin, Endocrine, Musculoskeletal, Psychiatric, and Hematologic/Lymphatic.    MEDICATIONS  (STANDING):  docusate sodium 100 milliGRAM(s) Oral three times a day  enoxaparin Injectable 40 milliGRAM(s) SubCutaneous daily    MEDICATIONS  (PRN):  acetaminophen   Tablet .. 650 milliGRAM(s) Oral every 6 hours PRN Temp greater or equal to 38C (100.4F), Mild Pain (1 - 3)  acetaminophen 300 mG/butalbital 50 mG/ caffeine 40 mG 1 Capsule(s) Oral every 6 hours PRN HA  ondansetron Injectable 4 milliGRAM(s) IV Push every 6 hours PRN Nausea  senna 2 Tablet(s) Oral at bedtime PRN Constipation  traMADol 50 milliGRAM(s) Oral every 6 hours PRN Severe Pain (7 - 10)    Allergies  iodine (Anaphylaxis)  Naprosyn (Anaphylaxis)  penicillins (Anaphylaxis)    FAMILY HISTORY:  Deferred    SOCIAL HISTORY:  Deferred    VITAL SIGNS:  Vital Signs Last 24 Hrs  T(C): 37.2 (22 Jul 2019 17:00), Max: 37.3 (21 Jul 2019 21:36)  T(F): 99 (22 Jul 2019 17:00), Max: 99.1 (21 Jul 2019 21:36)  HR: 84 (22 Jul 2019 16:35) (68 - 88)  BP: 140/74 (22 Jul 2019 16:35) (127/66 - 148/65)  BP(mean): 101 (22 Jul 2019 16:35) (84 - 101)  RR: 16 (22 Jul 2019 16:35) (16 - 20)  SpO2: 96% (22 Jul 2019 16:35) (94% - 97%)    PHYSICAL EXAMINATION:  Constitutional: WDWN; NAD  Eyes: Conjunctiva and sclera clear.  Cardiovascular: Regular rate and rhythm; S1 and S2 Normal; No murmurs, gallops or rubs.  Neurologic:  Orientation: person accurate, date accurate, place accurate.  Right left confusion?: no.  II: Visual fields intact.  III, IV, VI: EOMI. No nystagmus. PERRLA bilaterally.  V: intact to light touch.  VII: smile symmetrical. Eyebrow elevation symmetrical. Eye closure symmetrical. No flattening of nasolabial fold.  VIII: grossly intact/symmetric hearing.  IX, X: palate elevation symmetrical.  XI: sternocleidomastoid 5R/5L, trapezius 5R/5L  XII: no tongue deviation.  Motor: no atrophy or fasciculations. No tremor. No hypo/hyperkinesia. No pronator drift. Tone normal. Finger tap rapid and equal on both sides. Strength: deltoids 5R/5L, biceps 5R/5L, triceps 5R/5L, wrist flexors 5R/5L, wrist extensors 5R/5L,  strong and equal R/L, hip flexors 5R/5L, leg flexors 5R/5L, leg extensors 5R/5L, ankle plantarflexion 5R/5L, ankle dorsiflexion 5R/5L  Reflexes: biceps 2R/2L, triceps 2R/2L, brachioradialis 2R/2L, patellar 2R/2L, ankles 2R/2L. Toes down R/L  Coordination: finger to nose without dysmetria or overshoot R/L. Romberg negative.  Sensory: intact on R and L hemibody to light touch, pin, vibration, proprioception.  Gait: Pt had dizziness upon standing from a seated position.     Orthostatics were positive with an increase in HR >20      LABS:                        13.3   7.47  )-----------( 212      ( 21 Jul 2019 06:30 )             41.5     07-21    137  |  104  |  17  ----------------------------<  98  4.3   |  24  |  0.76    Ca    9.2      21 Jul 2019 06:30  Phos  3.9     07-21  Mg     2.0     07-21            RADIOLOGY & ADDITIONAL STUDIES:      IMPRESSION & RECOMMENDATIONS:

## 2019-07-23 ENCOUNTER — TRANSCRIPTION ENCOUNTER (OUTPATIENT)
Age: 84
End: 2019-07-23

## 2019-07-23 ENCOUNTER — APPOINTMENT (OUTPATIENT)
Dept: NEUROSURGERY | Facility: CLINIC | Age: 84
End: 2019-07-23

## 2019-07-23 VITALS — TEMPERATURE: 99 F

## 2019-07-23 LAB
ANION GAP SERPL CALC-SCNC: 10 MMOL/L — SIGNIFICANT CHANGE UP (ref 5–17)
BUN SERPL-MCNC: 16 MG/DL — SIGNIFICANT CHANGE UP (ref 7–23)
CALCIUM SERPL-MCNC: 9.1 MG/DL — SIGNIFICANT CHANGE UP (ref 8.4–10.5)
CHLORIDE SERPL-SCNC: 103 MMOL/L — SIGNIFICANT CHANGE UP (ref 96–108)
CO2 SERPL-SCNC: 25 MMOL/L — SIGNIFICANT CHANGE UP (ref 22–31)
CREAT SERPL-MCNC: 0.86 MG/DL — SIGNIFICANT CHANGE UP (ref 0.5–1.3)
GLUCOSE SERPL-MCNC: 104 MG/DL — HIGH (ref 70–99)
HCT VFR BLD CALC: 39.9 % — SIGNIFICANT CHANGE UP (ref 34.5–45)
HGB BLD-MCNC: 12.8 G/DL — SIGNIFICANT CHANGE UP (ref 11.5–15.5)
MAGNESIUM SERPL-MCNC: 2.1 MG/DL — SIGNIFICANT CHANGE UP (ref 1.6–2.6)
MCHC RBC-ENTMCNC: 30.8 PG — SIGNIFICANT CHANGE UP (ref 27–34)
MCHC RBC-ENTMCNC: 32.1 GM/DL — SIGNIFICANT CHANGE UP (ref 32–36)
MCV RBC AUTO: 96.1 FL — SIGNIFICANT CHANGE UP (ref 80–100)
NRBC # BLD: 0 /100 WBCS — SIGNIFICANT CHANGE UP (ref 0–0)
PHOSPHATE SERPL-MCNC: 3.5 MG/DL — SIGNIFICANT CHANGE UP (ref 2.5–4.5)
PLATELET # BLD AUTO: 212 K/UL — SIGNIFICANT CHANGE UP (ref 150–400)
POTASSIUM SERPL-MCNC: 4 MMOL/L — SIGNIFICANT CHANGE UP (ref 3.5–5.3)
POTASSIUM SERPL-SCNC: 4 MMOL/L — SIGNIFICANT CHANGE UP (ref 3.5–5.3)
RBC # BLD: 4.15 M/UL — SIGNIFICANT CHANGE UP (ref 3.8–5.2)
RBC # FLD: 13.9 % — SIGNIFICANT CHANGE UP (ref 10.3–14.5)
SODIUM SERPL-SCNC: 138 MMOL/L — SIGNIFICANT CHANGE UP (ref 135–145)
WBC # BLD: 8.99 K/UL — SIGNIFICANT CHANGE UP (ref 3.8–10.5)
WBC # FLD AUTO: 8.99 K/UL — SIGNIFICANT CHANGE UP (ref 3.8–10.5)

## 2019-07-23 PROCEDURE — 86850 RBC ANTIBODY SCREEN: CPT

## 2019-07-23 PROCEDURE — 85027 COMPLETE CBC AUTOMATED: CPT

## 2019-07-23 PROCEDURE — 93306 TTE W/DOPPLER COMPLETE: CPT

## 2019-07-23 PROCEDURE — 97530 THERAPEUTIC ACTIVITIES: CPT

## 2019-07-23 PROCEDURE — 86900 BLOOD TYPING SEROLOGIC ABO: CPT

## 2019-07-23 PROCEDURE — 99231 SBSQ HOSP IP/OBS SF/LOW 25: CPT

## 2019-07-23 PROCEDURE — 85025 COMPLETE CBC W/AUTO DIFF WBC: CPT

## 2019-07-23 PROCEDURE — 80053 COMPREHEN METABOLIC PANEL: CPT

## 2019-07-23 PROCEDURE — 80048 BASIC METABOLIC PNL TOTAL CA: CPT

## 2019-07-23 PROCEDURE — 82550 ASSAY OF CK (CPK): CPT

## 2019-07-23 PROCEDURE — 85610 PROTHROMBIN TIME: CPT

## 2019-07-23 PROCEDURE — 82553 CREATINE MB FRACTION: CPT

## 2019-07-23 PROCEDURE — 72146 MRI CHEST SPINE W/O DYE: CPT

## 2019-07-23 PROCEDURE — 72148 MRI LUMBAR SPINE W/O DYE: CPT

## 2019-07-23 PROCEDURE — 36415 COLL VENOUS BLD VENIPUNCTURE: CPT

## 2019-07-23 PROCEDURE — 99233 SBSQ HOSP IP/OBS HIGH 50: CPT

## 2019-07-23 PROCEDURE — 71045 X-RAY EXAM CHEST 1 VIEW: CPT

## 2019-07-23 PROCEDURE — 86901 BLOOD TYPING SEROLOGIC RH(D): CPT

## 2019-07-23 PROCEDURE — 93005 ELECTROCARDIOGRAM TRACING: CPT

## 2019-07-23 PROCEDURE — 84484 ASSAY OF TROPONIN QUANT: CPT

## 2019-07-23 PROCEDURE — 72141 MRI NECK SPINE W/O DYE: CPT

## 2019-07-23 PROCEDURE — 99285 EMERGENCY DEPT VISIT HI MDM: CPT | Mod: 25

## 2019-07-23 PROCEDURE — 83735 ASSAY OF MAGNESIUM: CPT

## 2019-07-23 PROCEDURE — 84100 ASSAY OF PHOSPHORUS: CPT

## 2019-07-23 PROCEDURE — 70450 CT HEAD/BRAIN W/O DYE: CPT

## 2019-07-23 PROCEDURE — 95951: CPT

## 2019-07-23 PROCEDURE — 97116 GAIT TRAINING THERAPY: CPT

## 2019-07-23 PROCEDURE — 85730 THROMBOPLASTIN TIME PARTIAL: CPT

## 2019-07-23 PROCEDURE — 96374 THER/PROPH/DIAG INJ IV PUSH: CPT

## 2019-07-23 PROCEDURE — 97162 PT EVAL MOD COMPLEX 30 MIN: CPT

## 2019-07-23 PROCEDURE — 97161 PT EVAL LOW COMPLEX 20 MIN: CPT

## 2019-07-23 PROCEDURE — 99238 HOSP IP/OBS DSCHRG MGMT 30/<: CPT

## 2019-07-23 PROCEDURE — 83036 HEMOGLOBIN GLYCOSYLATED A1C: CPT

## 2019-07-23 RX ORDER — TRAMADOL HYDROCHLORIDE 50 MG/1
1 TABLET ORAL
Qty: 40 | Refills: 0
Start: 2019-07-23 | End: 2019-08-01

## 2019-07-23 RX ADMIN — Medication 650 MILLIGRAM(S): at 06:12

## 2019-07-23 RX ADMIN — Medication 650 MILLIGRAM(S): at 15:49

## 2019-07-23 RX ADMIN — Medication 650 MILLIGRAM(S): at 16:27

## 2019-07-23 RX ADMIN — Medication 650 MILLIGRAM(S): at 07:12

## 2019-07-23 RX ADMIN — Medication 100 MILLIGRAM(S): at 06:12

## 2019-07-23 NOTE — DISCHARGE NOTE PROVIDER - HOSPITAL COURSE
7/20 CTH x2 stable    7/21 VEEG ordered for sycope workup.    7/22 Echo for syncope workup. VEEG neg. +orthostatic hypotension    7/23 compression stockings for orthostatic hypotention. HA improving.  No further workup required per medicine.  Cleared for discharge to home with home care

## 2019-07-23 NOTE — PROGRESS NOTE ADULT - SUBJECTIVE AND OBJECTIVE BOX
=================================  NEUROCRITICAL CARE ATTENDING NOTE  =================================    MITCHELL HINOJOSA   MRN-[id number]  Summary:  84y/F with h/o cerebral aneurysms presented with fall 3 weeks ago with LOC, then had headaches, forgetfulness, gait instability.  Brought to MD where MRA showed bilateral SDH, stable aneurysms, sent to ED, admitted to neurosurgery.     COURSE IN THE HOSPITAL:   Admitted to [facility]   No significant events overnight.     Past Medical History: Intracranial aneurysm  Allergies:  iodine (Anaphylaxis) Naprosyn (Anaphylaxis) penicillins (Anaphylaxis)   Home meds:     PHYSICAL EXAMINATION  T(C): 37.9 ( @ 04:53), Max: 37.9 ( @ 04:53) HR: 88 ( @ 05:20) (74 - 88) BP: 130/69 ( @ 05:20) (130/69 - 164/70) RR: 17 ( @ 05:20) (16 - 18) SpO2: 94% ( @ 05:20) (94% - 97%)  NEUROLOGIC EXAMINATION:  Patient is awake, alert, fully oriented, pupils 2-3mm equal and briskly reactive to light, EOMs intact, muscle strength 5/5 on all 4s.  GENERAL: not intubated, not in cardiorespiratory distress  EENT:  anicteric  CARDIOVASCULAR: (+) S1 S2, normal rate and regular rhythm  PULMONARY: clear to auscultation bilaterally  ABDOMEN: soft, nontender with normoactive bowel sounds  EXTREMITIES: no edema  SKIN: no rash    LABS:             12.8   8.99  )-----------( 212      ( 2019 07:44 )             39.9     138  |  103  |  16  ----------------------------<  104<H>  4.0   |  25  |  0.86    Ca    9.1      2019 07:44  Phos  3.5       Mg     2.1      @ 07: @ 07:00  IN: 120 mL / OUT: 450 mL / NET: -330 mL    Bacteriology:  CSF studies:  EEG:  Neuroimagin/20 CT head:  stable exam   MRA:  inc in size of R supraclinoid IcA aneurysm, incorporating Pcomm, other aneurysms unchanged (RA2, Acomm, R distal M1, L M1.  bilateral frontoparietal SDH  Other imaging:    MEDICATIONS:   SQL 40 daily docusate 100 TID senna PRN tramadol PRN     IV FLUIDS: IVL  DRIPS:  DIET: regular  Lines:  Drains:    Wounds:    CODE STATUS:  Full Code                       GOALS OF CARE:  aggressive                      DISPOSITION:  8La

## 2019-07-23 NOTE — PROGRESS NOTE ADULT - ASSESSMENT
84y/F with  1.  bilatearl frontoparietal subdural hematoma  2.  multiple unruptured intracranial aneurysms (R supraclinoid, R A2, Acomm, R distal M1, L M1  3.  syncope    PLAN:   NEURO: neurochecks qshift, PRN pain meds with Tylenol, tramadol  SDH:  no indications for surgical evacuation for now  REHAB:  physical therapy evaluation and management    EARLY MOB:  OOB to chair, ambulate / syncope and fall precautions    PULM:  Room air, incentive spirometry  CARDIO:  SBP goal 100-150mm Hg, echo mild conc LVH  ENDO:  Blood sugar goals 140-180 mg/dL, continue insulin sliding scale  GI:  docusate senna  DIET: regular diet  RENAL:  IVL  HEM/ONC: Hb stable  VTE Prophylaxis: SCDs, SQL  ID: afebrile, no leukocytosis  Social: will update family  DISPO PLANNING    ATTENDING ATTESTATION:  I was physically present for the key portions of the evaluation and management (E/M) service provided.  I agree with the above history, physical and plan which I have reviewed and edited where appropriate.     Patient not at high risk for neurologic deterioration / death.  Time spent on this noncritically ill patient: 45 minutes spent on total encounter, more than 50% of the visit was spent counseling and/or coordinating care by the attending physician.    Plan discussed with RN, house staff.    REVIEW OF SYSTEMS:  No headaches, no nausea or vomiting; 14 -point review of systems otherwise unremarkable.

## 2019-07-23 NOTE — DISCHARGE NOTE PROVIDER - NSDCCPCAREPLAN_GEN_ALL_CORE_FT
PRINCIPAL DISCHARGE DIAGNOSIS  Diagnosis: Subdural hematoma  Assessment and Plan of Treatment:       SECONDARY DISCHARGE DIAGNOSES  Diagnosis: Orthostatic hypotension  Assessment and Plan of Treatment:

## 2019-07-23 NOTE — DISCHARGE NOTE PROVIDER - NSDCFUADDINST_GEN_ALL_CORE_FT
-Follow up with your PCP within 1-2 weeks of discharge re: diagnosis of orthostatic hypotension  -Follow up with Dr. Cruz for follow up re: cerebral aneurysms, call office for appointment

## 2019-07-23 NOTE — DISCHARGE NOTE NURSING/CASE MANAGEMENT/SOCIAL WORK - NSDCDPATPORTLINK_GEN_ALL_CORE
You can access the [facility] Portal, offered by [facility], by registering with the following website: [email or web address]

## 2019-07-23 NOTE — PROGRESS NOTE ADULT - SUBJECTIVE AND OBJECTIVE BOX
INTERVAL HPI/OVERNIGHT EVENTS:      On further ROS, patient did not have complaint of: Headache, Blurred Vision, Cough, Dyspnea, Palpitations, Abdominal Pain, N/V, Weakness, Change in Sensation.     VITAL SIGNS:  T(F): 98.8 (07-23-19 @ 09:16)  HR: 70 (07-23-19 @ 08:16)  BP: 132/66 (07-23-19 @ 08:16)  RR: 18 (07-23-19 @ 08:16)  SpO2: 97% (07-23-19 @ 08:16)    Input & Output:  07-22-19 @ 07:01  -  07-23-19 @ 07:00  --------------------------------------------------------  IN: 120 mL / OUT: 450 mL / NET: -330 mL    07-23-19 @ 07:01  -  07-23-19 @ 12:27  --------------------------------------------------------  IN: 240 mL / OUT: 0 mL / NET: 240 mL    PHYSICAL EXAM:  Constitutional: Patient seated comfortably in bed, of appropriate color, nutrition, and hydration.   HEENT: PERRLA, Normal Range of eye movement with no complaint of diplopia, Normal Hearing  Neck: No LAD, No JVD  Back: Normal spine flexure, No CVA tenderness  Respiratory: Normal air entry, Lungs clear to auscultation b/l w/o wheeze or crepitations.   Cardiovascular: S1 and S2 present - no additional abnormal sounds or murmurs. Normal rhythm and rate of pulse.   Gastrointestinal: BS+, soft, NT/ND  Extremities: No peripheral edema  Vascular: 2+ peripheral pulses  Neurological: A/O x 3, CN V-XII grossly intact.  Psychiatric: Normal mood, normal affect  Musculoskeletal: 5/5 strength b/l upper and lower extremities  Skin: No rashes    MEDICATIONS  (STANDING):  docusate sodium 100 milliGRAM(s) Oral three times a day  enoxaparin Injectable 40 milliGRAM(s) SubCutaneous daily    MEDICATIONS  (PRN):  acetaminophen   Tablet .. 650 milliGRAM(s) Oral every 6 hours PRN Temp greater or equal to 38C (100.4F), Mild Pain (1 - 3)  acetaminophen 300 mG/butalbital 50 mG/ caffeine 40 mG 1 Capsule(s) Oral every 6 hours PRN HA  ondansetron Injectable 4 milliGRAM(s) IV Push every 6 hours PRN Nausea  senna 2 Tablet(s) Oral at bedtime PRN Constipation  traMADol 50 milliGRAM(s) Oral every 6 hours PRN Severe Pain (7 - 10)      Allergies  iodine (Anaphylaxis)  Naprosyn (Anaphylaxis)  penicillins (Anaphylaxis)    LABS:                        12.8   8.99  )-----------( 212      ( 23 Jul 2019 07:44 )             39.9     07-23    138  |  103  |  16  ----------------------------<  104<H>  4.0   |  25  |  0.86    Ca    9.1      23 Jul 2019 07:44  Phos  3.5     07-23  Mg     2.1     07-23        RADIOLOGY & ADDITIONAL TESTS: INTERVAL HPI/OVERNIGHT EVENTS:  No overnight events.   Patient reports some instability with walking, but improved since yesterday,     On further ROS, patient did not have complaint of: Headache, Blurred Vision, Cough, Dyspnea, Palpitations, Abdominal Pain, N/V, Weakness, Change in Sensation.     VITAL SIGNS:  T(F): 98.8 (07-23-19 @ 09:16)  HR: 70 (07-23-19 @ 08:16)  BP: 132/66 (07-23-19 @ 08:16)  RR: 18 (07-23-19 @ 08:16)  SpO2: 97% (07-23-19 @ 08:16)    Input & Output:  07-22-19 @ 07:01  -  07-23-19 @ 07:00  --------------------------------------------------------  IN: 120 mL / OUT: 450 mL / NET: -330 mL    07-23-19 @ 07:01  -  07-23-19 @ 12:27  --------------------------------------------------------  IN: 240 mL / OUT: 0 mL / NET: 240 mL    PHYSICAL EXAM:  Constitutional: Patient seated comfortably in bed, of appropriate color, nutrition, and hydration.   HEENT: PERRLA, Normal Range of eye movement with no complaint of diplopia, Normal Hearing  Neck: No LAD, No JVD  Back: Normal spine flexure, No CVA tenderness  Respiratory: Normal air entry, Lungs clear to auscultation b/l w/o wheeze or crepitations.   Cardiovascular: S1 and S2 present - no additional abnormal sounds or murmurs. Normal rhythm and rate of pulse.   Gastrointestinal: BS+, soft, NT/ND  Extremities: No peripheral edema  Vascular: 2+ peripheral pulses  Neurological: A/O x 3, CN V-XII grossly intact.  Psychiatric: Normal mood, normal affect  Musculoskeletal: 5/5 strength b/l upper and lower extremities  Skin: No rashes    MEDICATIONS  (STANDING):  docusate sodium 100 milliGRAM(s) Oral three times a day  enoxaparin Injectable 40 milliGRAM(s) SubCutaneous daily    MEDICATIONS  (PRN):  acetaminophen   Tablet .. 650 milliGRAM(s) Oral every 6 hours PRN Temp greater or equal to 38C (100.4F), Mild Pain (1 - 3)  acetaminophen 300 mG/butalbital 50 mG/ caffeine 40 mG 1 Capsule(s) Oral every 6 hours PRN HA  ondansetron Injectable 4 milliGRAM(s) IV Push every 6 hours PRN Nausea  senna 2 Tablet(s) Oral at bedtime PRN Constipation  traMADol 50 milliGRAM(s) Oral every 6 hours PRN Severe Pain (7 - 10)      Allergies  iodine (Anaphylaxis)  Naprosyn (Anaphylaxis)  penicillins (Anaphylaxis)    LABS:                        12.8   8.99  )-----------( 212      ( 23 Jul 2019 07:44 )             39.9     07-23    138  |  103  |  16  ----------------------------<  104<H>  4.0   |  25  |  0.86    Ca    9.1      23 Jul 2019 07:44  Phos  3.5     07-23  Mg     2.1     07-23        RADIOLOGY & ADDITIONAL TESTS: INTERVAL HPI/OVERNIGHT EVENTS:  No overnight events.   feels well -   Patient reports some instability with walking, but improved since yesterday,   low grade temp to 100.3, denies cough, rhinorrhea, pharyngitis, nasal congestion, dysuria, diarrhea    On further ROS, patient did not have complaint of: Headache, Blurred Vision, Cough, Dyspnea, Palpitations, Abdominal Pain, N/V, Weakness, Change in Sensation.     VITAL SIGNS:  T(F): 98.8 (07-23-19 @ 09:16)  HR: 70 (07-23-19 @ 08:16)  BP: 132/66 (07-23-19 @ 08:16)  RR: 18 (07-23-19 @ 08:16)  SpO2: 97% (07-23-19 @ 08:16)    Input & Output:  07-22-19 @ 07:01  -  07-23-19 @ 07:00  --------------------------------------------------------  IN: 120 mL / OUT: 450 mL / NET: -330 mL    07-23-19 @ 07:01  -  07-23-19 @ 12:27  --------------------------------------------------------  IN: 240 mL / OUT: 0 mL / NET: 240 mL    PHYSICAL EXAM:  Constitutional: Patient seated comfortably in bed, of appropriate color, nutrition, and hydration.   HEENT: PERRLA, Normal Range of eye movement with no complaint of diplopia, Normal Hearing  Neck: No LAD, No JVD  Back: Normal spine flexure, No CVA tenderness  Respiratory: Normal air entry, Lungs clear to auscultation b/l w/o wheeze or crepitations.   Cardiovascular: S1 and S2 present - no additional abnormal sounds or murmurs. Normal rhythm and rate of pulse.   Gastrointestinal: BS+, soft, NT/ND  Extremities: No peripheral edema  Vascular: 2+ peripheral pulses  Neurological: A/O x 3, CN V-XII grossly intact. no dysmetria or dysdiadochokinesia  Psychiatric: Normal mood, normal affect  Musculoskeletal: 5/5 strength b/l upper and lower extremities  Skin: No rashes    MEDICATIONS  (STANDING):  docusate sodium 100 milliGRAM(s) Oral three times a day  enoxaparin Injectable 40 milliGRAM(s) SubCutaneous daily    MEDICATIONS  (PRN):  acetaminophen   Tablet .. 650 milliGRAM(s) Oral every 6 hours PRN Temp greater or equal to 38C (100.4F), Mild Pain (1 - 3)  acetaminophen 300 mG/butalbital 50 mG/ caffeine 40 mG 1 Capsule(s) Oral every 6 hours PRN HA  ondansetron Injectable 4 milliGRAM(s) IV Push every 6 hours PRN Nausea  senna 2 Tablet(s) Oral at bedtime PRN Constipation  traMADol 50 milliGRAM(s) Oral every 6 hours PRN Severe Pain (7 - 10)      Allergies  iodine (Anaphylaxis)  Naprosyn (Anaphylaxis)  penicillins (Anaphylaxis)    LABS:                        12.8   8.99  )-----------( 212      ( 23 Jul 2019 07:44 )             39.9     07-23    138  |  103  |  16  ----------------------------<  104<H>  4.0   |  25  |  0.86    Ca    9.1      23 Jul 2019 07:44  Phos  3.5     07-23  Mg     2.1     07-23        RADIOLOGY & ADDITIONAL TESTS:

## 2019-07-23 NOTE — DISCHARGE NOTE PROVIDER - CARE PROVIDER_API CALL
Martín Pelaez)  Neurosurgery  General  [address] [place] [address], [address]  [place] [address]  Phone: [phone]  Fax: [phone]  Follow Up Time:     Israel Cruz)  Neurology; Vascular Neurology  [address] [place] [address]  Phone: [phone]  Fax: [phone]  Follow Up Time:

## 2019-07-23 NOTE — PROGRESS NOTE ADULT - PROVIDER SPECIALTY LIST ADULT
Internal Medicine
NSICU
Neurosurgery
NSICU

## 2019-07-23 NOTE — PROGRESS NOTE ADULT - SUBJECTIVE AND OBJECTIVE BOX
HPI:  85 y/o F with PMHx of cerebral aneurysms (followed by neurosurgeon Dr. Romero) presents to ER after outpatient MRA revealed bilateral subdural hematomas, however stable cerebral aneurysms. Per patient's daughter, Pt fell 3 weeks ago at home with LOC, however Pt refused to seek medical attention. Since the fall, Pt reports generalized headaches, forgetfulness and gait instability. Pt denies nausea, vomiting, acute weakness/loss of sensation of extremities, dysarthria, dysphagia, urinary/bowel incontinence, neck pain, fever. (19 Jul 2019 22:08)    OVERNIGHT EVENTS:  No events overnight.    7/20 CTH x2 stable  7/21 VEEG ordered for sycope workup.  7/22 Echo for syncope workup. VEEG neg. +orthostatic hypotension  7/23 compression stockings for orthostatic hypotention. HA improving    Vital Signs Last 24 Hrs  T(C): 37.1 (22 Jul 2019 22:35), Max: 37.2 (22 Jul 2019 17:00)  T(F): 98.8 (22 Jul 2019 22:35), Max: 99 (22 Jul 2019 17:00)  HR: 80 (22 Jul 2019 20:22) (68 - 84)  BP: 164/70 (22 Jul 2019 20:22) (128/60 - 164/70)  BP(mean): 101 (22 Jul 2019 16:35) (84 - 101)  RR: 17 (22 Jul 2019 20:22) (16 - 18)  SpO2: 96% (22 Jul 2019 20:22) (94% - 97%)    I&O's Summary    21 Jul 2019 07:01  -  22 Jul 2019 07:00  --------------------------------------------------------  IN: 560 mL / OUT: 650 mL / NET: -90 mL    22 Jul 2019 07:01  -  23 Jul 2019 00:46  --------------------------------------------------------  IN: 120 mL / OUT: 250 mL / NET: -130 mL        PHYSICAL EXAM:  Neurological:  AxOx3  doesn't appear confused today  no pronator drift  5/5 motor x 4ex       TUBES/LINES:  [] Carbajal  [] Lumbar Drain  [] Wound Drains  [] Others      DIET:  [] NPO  [x] Mechanical  [] Tube feeds    LABS:                        13.3   7.47  )-----------( 212      ( 21 Jul 2019 06:30 )             41.5     07-21    137  |  104  |  17  ----------------------------<  98  4.3   |  24  |  0.76    Ca    9.2      21 Jul 2019 06:30  Phos  3.9     07-21  Mg     2.0     07-21              CAPILLARY BLOOD GLUCOSE          Drug Levels: [] N/A    CSF Analysis: [] N/A      Allergies    iodine (Anaphylaxis)  Naprosyn (Anaphylaxis)  penicillins (Anaphylaxis)    Intolerances      MEDICATIONS:  Antibiotics:    Neuro:  acetaminophen   Tablet .. 650 milliGRAM(s) Oral every 6 hours PRN  acetaminophen 300 mG/butalbital 50 mG/ caffeine 40 mG 1 Capsule(s) Oral every 6 hours PRN  ondansetron Injectable 4 milliGRAM(s) IV Push every 6 hours PRN  traMADol 50 milliGRAM(s) Oral every 6 hours PRN    Anticoagulation:  enoxaparin Injectable 40 milliGRAM(s) SubCutaneous daily    OTHER:  docusate sodium 100 milliGRAM(s) Oral three times a day  senna 2 Tablet(s) Oral at bedtime PRN    IVF:    CULTURES:    RADIOLOGY & ADDITIONAL TESTS:  < from: CT Head No Cont (07.20.19 @ 16:14) >  Stable exam. No change in bilateral subcentimeter frontoparietal subdural   hematomas.    < end of copied text >      ASSESSMENT:  84y Female w/b/l tiny acute subacute SDH, stable on repeat    SUBDURAL HEMATOMA  Handoff  MEWS Score  Intracranial aneurysm  Subdural hematoma  No significant past surgical history  No significant past surgical history  ABNORMAL LABS  90+      PLAN:  NEURO:    -neurochecks  -syncope workup. f/u VEEG neg  -echo done. WNL  -+Orthostatic hypotension. Compression stockings.  -BP control  -CTH x 2 stable. Plan for conservative management at this point.   -MRI C/T/L done. Mild spinal stenosis  -no chemoprophylaxis due to brain hemorrhage  -D/W     DVT PROPHYLAXIS:  [x] Venodynes                                [] Heparin/Lovenox    DISPOSITION: pending progress

## 2019-07-23 NOTE — PROGRESS NOTE ADULT - ASSESSMENT
84F with subdural hematoma and concern for syncope. Unclear if patient had syncopal episode resulting in fall or fell out of bed while sleeping. Would complete work up by getting vEEG to evaluate for seizure.       #Syncope  -Admission ECG-12 NSR with no ischemic changes;   -NSR on tele  -ECHO wnl  -Given c/f confusion post-fall, EEG was recommended - and reportedly normal     #Orthostasis   - Patient was symptomatically orthostatic (not by vitals) and Neuro was consulted - recommendation made to increase salt in diet, increase hydration, and use compression stockings. Consideration for use of Midodrine if symptoms persists.  - Orthostatics repeated today - patient was not symptomatic nor did she demonstrate hypotension with standing.     #Subdural Hematoma  -Management as per NeuroSurg team  -CT Imaging illustrates stability, patient to f/u as an outpatient with Dr Cruz   -PT recommended Acute Rehab - family has opted to take her home.     Discussed with Attending and Primary Team. 84F with subdural hematoma and concern for syncope. Unclear if patient had syncopal episode resulting in fall or fell out of bed while sleeping. Would complete work up by getting vEEG to evaluate for seizure.       #Syncope  -Admission ECG-12 NSR with no ischemic changes;   -NSR on tele  -ECHO wnl  -Given c/f confusion post-fall, recommend EEG      #r/o Orthostasis   - Orthostatics repeated today - negative. patient was not symptomatic     # low grade temp  -has no symptoms/signs of infection  -monitor    #Subdural Hematoma  -Management as per NeuroSurg team  -CT Imaging illustrates stability, patient to f/u as an outpatient with Dr Cruz   -PT recommended Acute Rehab - family has opted to take her home.     Discussed with Attending and Primary Team.

## 2019-07-23 NOTE — DISCHARGE NOTE PROVIDER - CARE PROVIDERS DIRECT ADDRESSES
[email or web address]

## 2019-07-29 DIAGNOSIS — M48.00 SPINAL STENOSIS, SITE UNSPECIFIED: ICD-10-CM
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
## 2019-07-29 DIAGNOSIS — I67.1 CEREBRAL ANEURYSM, NONRUPTURED: ICD-10-CM
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
## 2019-07-29 DIAGNOSIS — I95.1 ORTHOSTATIC HYPOTENSION: ICD-10-CM

## 2019-07-29 DIAGNOSIS — S06.5X9A TRAUMATIC SUBDURAL HEMORRHAGE WITH LOSS OF CONSCIOUSNESS OF UNSPECIFIED DURATION, INITIAL ENCOUNTER: ICD-10-CM

## 2019-08-15 ENCOUNTER — APPOINTMENT (OUTPATIENT)
Dept: NEUROSURGERY | Facility: CLINIC | Age: 84
End: 2019-08-15
Payer: MEDICARE

## 2019-08-15 VITALS — DIASTOLIC BLOOD PRESSURE: 60 MMHG | SYSTOLIC BLOOD PRESSURE: 165 MMHG

## 2019-08-15 VITALS
OXYGEN SATURATION: 95 % | HEIGHT: 64 IN | DIASTOLIC BLOOD PRESSURE: 71 MMHG | RESPIRATION RATE: 16 BRPM | SYSTOLIC BLOOD PRESSURE: 187 MMHG | TEMPERATURE: 98.1 F | BODY MASS INDEX: 20.14 KG/M2 | WEIGHT: 118 LBS | HEART RATE: 71 BPM

## 2019-08-15 DIAGNOSIS — Z79.02 LONG TERM (CURRENT) USE OF ANTITHROMBOTICS/ANTIPLATELETS: ICD-10-CM

## 2019-08-15 DIAGNOSIS — Z87.891 PERSONAL HISTORY OF NICOTINE DEPENDENCE: ICD-10-CM

## 2019-08-15 DIAGNOSIS — I67.1 CEREBRAL ANEURYSM, NONRUPTURED: ICD-10-CM

## 2019-08-15 DIAGNOSIS — Z91.041 RADIOGRAPHIC DYE ALLERGY STATUS: ICD-10-CM

## 2019-08-15 DIAGNOSIS — S06.5X9A TRAUMATIC SUBDURAL HEMORRHAGE WITH LOSS OF CONSCIOUSNESS OF UNSPECIFIED DURATION, INITIAL ENCOUNTER: ICD-10-CM

## 2019-08-15 PROCEDURE — 99215 OFFICE O/P EST HI 40 MIN: CPT

## 2019-08-15 RX ORDER — BUTALBITAL AND ACETAMINOPHEN 300; 50 MG/1; MG/1
TABLET ORAL
Refills: 0 | Status: ACTIVE | COMMUNITY

## 2019-08-15 RX ORDER — PREDNISONE 50 MG/1
50 TABLET ORAL
Qty: 4 | Refills: 0 | Status: ACTIVE | COMMUNITY
Start: 2019-08-15 | End: 1900-01-01

## 2019-08-15 RX ORDER — VERAPAMIL HYDROCHLORIDE 40 MG/1
40 TABLET ORAL DAILY
Qty: 30 | Refills: 3 | Status: DISCONTINUED | COMMUNITY
Start: 2017-10-25 | End: 2019-08-15

## 2019-08-15 RX ORDER — TRAMADOL HYDROCHLORIDE 50 MG/1
50 TABLET, COATED ORAL
Refills: 0 | Status: ACTIVE | COMMUNITY

## 2019-08-15 RX ORDER — MULTIVITAMIN
TABLET ORAL
Refills: 0 | Status: DISCONTINUED | COMMUNITY
End: 2019-08-15

## 2019-08-15 NOTE — PHYSICAL EXAM
[General Appearance - Alert] : alert [Oriented To Time, Place, And Person] : oriented to person, place, and time [Impaired Insight] : insight and judgment were intact [Person] : oriented to person [Place] : oriented to place [Time] : oriented to time [Cranial Nerves Optic (II)] : visual acuity intact bilaterally,  pupils equal round and reactive to light [Cranial Nerves Oculomotor (III)] : extraocular motion intact [Cranial Nerves Trigeminal (V)] : facial sensation intact symmetrically [Cranial Nerves Vestibulocochlear (VIII)] : hearing was intact bilaterally [Cranial Nerves Facial (VII)] : face symmetrical [Cranial Nerves Glossopharyngeal (IX)] : tongue and palate midline [Cranial Nerves Accessory (XI - Cranial And Spinal)] : head turning and shoulder shrug symmetric [Cranial Nerves Hypoglossal (XII)] : there was no tongue deviation with protrusion [Motor Tone] : muscle tone was normal in all four extremities [Motor Strength] : muscle strength was normal in all four extremities [Full Visual Field] : full visual field [PERRL With Normal Accommodation] : pupils were equal in size, round, reactive to light, with normal accommodation [Heart Sounds] : normal S1 and S2 [Apical Impulse] : the apical impulse was normal [Heart Rate And Rhythm] : heart rate was normal and rhythm regular [Bowel Sounds] : normal bowel sounds [Abdomen Soft] : soft [Abnormal Walk] : normal gait [FreeTextEntry1] : anxious

## 2019-08-15 NOTE — HISTORY OF PRESENT ILLNESS
[FreeTextEntry1] : RIGHT-HANDED former smoker (quit 4 months ago) with a medical history migraine headaches, multiple intracranial aneurysms diagnosed in 2011 diagnosed during a workup for headaches. She has been followed conservatively since then. \par \par She started to complain of a right side HA in 2015 that was different from the previous migraine HAs. 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 A follow up MRA form 7/2017 did not show change when compared to previous studies.\par \par ** She is allergic to iodinated contrast and aspirin (rash)**\par \par Handedness: RIGHT\par \par Patient Address:\par [address] [place] [address]\[place] [address]\par \par Referring:\par Lexie Sanders MD (neurologist)\par [address] [facility]\[place] [address] \par \par PCP:\par Dr. Nathen Sierra\par [address], [address]\[place] [address]\par [phone]

## 2019-08-15 NOTE — REASON FOR VISIT
[Other: _____] : [unfilled] [FreeTextEntry1] : Patient was advised to go to [facility] ER for findings of B/L SDH found with mostly stable aneurysms, except for RIGHT supraclinoid ICA (growth in size of base) on recent outpatient MRA brain. She had a fall in June 2019 with LOC but refused medical care. Since the fall, she reported generalized weakness, forgetfulness and gait instability. She was admitted to [facility] on 7/19/19.\par \par Case was discussed on Cerebrovascular Conference with the plan to perform cerebral angiogram with intent to treat the RIGHT supraclinoid aneurysm with dual microcatheter.\par \par TODAY'S VISIT:\par She complains of 9-10 RIGHT temporal AND RIGHT periorbital HA that is not relieved by Tramadol. She still complains of unsteady gait which began in June since the fall, and increase in forgetfulness. SHe has not seen Dr. Pelaez for the SDHs. She denies vision change, focal weakness and dysarthria.

## 2019-08-15 NOTE — DATA REVIEWED
[de-identified] : EXAM: MR ANGIO BRAIN \par \par PROCEDURE DATE: 07/18/2019 \par \par \par \par INTERPRETATION: PROCEDURE: MRA brain without contrast. \par \par INDICATION: Intracranial aneurysms, surveillance \par \par TECHNIQUE: The MRA of the [place] was performed utilizing 3D TOF \par technique. Rotational MIP series are provided. Limited imaging of the brain \par includes axial T2-FLAIR and diffusion imaging. \par \par COMPARISON: 7/29/2017. Cerebral angiogram 11/20/2015. \par \par FINDINGS: \par \par Current surveillance of multiple intracranial aneurysms is as follows, and \par as previously listed: \par \par 1-2. Right supraclinoid internal carotid artery aneurysm, at P-comm, \par measures maximum 6 mm in diameter. The base of the aneurysm is 5 mm, \par compared to 3 mm on the 2017 study. The posterior communicating artery \par projects at the base of the aneurysm and shows medially oriented \par infundibular dilatation (2.5 mm diameter, stable) with more normal caliber \par as it courses posteriorly. \par \par 3. Right A2 2.5 mm aneurysm is stable. \par \par 4. A-comm 2.5 mm aneurysm is stable. \par \par 5. A sub-2 mm right distal M1 aneurysm is stable. \par \par 6. Complex left proximal-mid M1 aneurysm is stable, 3 mm base, with two \par approximately 2-3 mm outpouchings. \par \par Both internal carotid arteries show preserved flow related signal without \par stenosis or occlusion. The anterior and middle cerebral arteries remain \par patent and symmetric without stenosis. The intracranial vertebral arteries \par are patent, the right side is dominant. The basilar artery is patent but \par diminutive in the presence of a hypoplastic left P1 segment and bilateral \par posterior communicating arteries. The posterior cerebral arteries remain \par patent. \par \par There are bilateral subdural hematomas over the frontal and parietal \par convexities. On the right, hematoma measures 7 mm maximum thickness, and on \par the left collection measures 5 mm there is no midline shift or downward \par herniation. Diffusion imaging is negative for recent infarction. \par Long-standing small vessel ischemic change within cerebral white matter and \par the deep gray matter has not significantly changed. \par \par \par IMPRESSION: \par \par There is slight, but measurable, increase in size of right supraclinoid ICA \par aneurysm, particularly at its base incorporating the P-comm. This may be \par further studied with direct cerebral angiogram for direct and most-precise \par comparison to 2015. \par \par The remainder of intracranial aneurysms are unchanged. \par \par Unexpectedly, there are small (subcentimeter) bilateral frontoparietal \par subdural hematomas. No midline shift or major mass effect. \par \par Findings were discussed with AASHISH Maria at 2:15 PM on 7/19/2019. \par \par \par \par \par \par Thank you for the opportunity to participate in the care of this patient. \par \par \par \par YANCY SERRANO M.D. ATTENDING RADIOLOGIST \par This document has been electronically signed. Jul 19 2019 2:35PM EXAM: MR ANGIO BRAIN \par \par PROCEDURE DATE: 07/18/2019 \par \par \par \par INTERPRETATION: PROCEDURE: MRA brain without contrast. \par \par INDICATION: Intracranial aneurysms, surveillance \par \par TECHNIQUE: The MRA of the [place] was performed utilizing 3D TOF \par technique. Rotational MIP series are provided. Limited imaging of the brain \par includes axial T2-FLAIR and diffusion imaging. \par \par COMPARISON: 7/29/2017. Cerebral angiogram 11/20/2015. \par \par FINDINGS: \par \par Current surveillance of multiple intracranial aneurysms is as follows, and \par as previously listed: \par \par 1-2. Right supraclinoid internal carotid artery aneurysm, at P-comm, \par measures maximum 6 mm in diameter. The base of the aneurysm is 5 mm, \par compared to 3 mm on the 2017 study. The posterior communicating artery \par projects at the base of the aneurysm and shows medially oriented \par infundibular dilatation (2.5 mm diameter, stable) with more normal caliber \par as it courses posteriorly. \par \par 3. Right A2 2.5 mm aneurysm is stable. \par \par 4. A-comm 2.5 mm aneurysm is stable. \par \par 5. A sub-2 mm right distal M1 aneurysm is stable. \par \par 6. Complex left proximal-mid M1 aneurysm is stable, 3 mm base, with two \par approximately 2-3 mm outpouchings. \par \par Both internal carotid arteries show preserved flow related signal without \par stenosis or occlusion. The anterior and middle cerebral arteries remain \par patent and symmetric without stenosis. The intracranial vertebral arteries \par are patent, the right side is dominant. The basilar artery is patent but \par diminutive in the presence of a hypoplastic left P1 segment and bilateral \par posterior communicating arteries. The posterior cerebral arteries remain \par patent. \par \par There are bilateral subdural hematomas over the frontal and parietal \par convexities. On the right, hematoma measures 7 mm maximum thickness, and on \par the left collection measures 5 mm there is no midline shift or downward \par herniation. Diffusion imaging is negative for recent infarction. \par Long-standing small vessel ischemic change within cerebral white matter and \par the deep gray matter has not significantly changed. \par \par \par IMPRESSION: \par \par There is slight, but measurable, increase in size of right supraclinoid ICA \par aneurysm, particularly at its base incorporating the P-comm. This may be \par further studied with direct cerebral angiogram for direct and most-precise \par comparison to 2015. \par \par The remainder of intracranial aneurysms are unchanged. \par \par Unexpectedly, there are small (subcentimeter) bilateral frontoparietal \par subdural hematomas. No midline shift or major mass effect. \par \par Findings were discussed with AASHISH Maria at 2:15 PM on 7/19/2019. \par \par \par \par \par \par Thank you for the opportunity to participate in the care of this patient. \par \par \par \par YANCY SERRANO M.D. ATTENDING RADIOLOGIST \par This document has been electronically signed. Jul 19 2019 2:35PM

## 2019-08-15 NOTE — END OF VISIT
[FreeTextEntry3] : Written by Rajwinder Montalvo NP acting as a scribe for Dr. Israel Cruz MD.  The documentation recorded by the scribe accurately reflects the service I personally performed and the decisions made by me, Dr. Israel Cruz \par \par

## 2019-08-15 NOTE — ADDENDUM
[FreeTextEntry1] : Dear Dr. Sanders:\par \par I hope you are doing well.  We saw Mrs. Acosta in our office at [facility].  She was accompanied by her son.  As you know, she is an 84 years-old right handed woman with past medical history of migraine headaches, cigarette smoking and multiple intracranial aneurysms diagnosed in 2011 during work up for headaches.  She has been followed conservatively since then.  \par \par In 2015 she started to complain of right side headache that was different from the previous migraine headaches.  She underwent a diagnostic cerebral angiogram in November 2015.  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  \par \par At that time we decided that given the small size of the aneurysms, her age and the fact that the aneurysms hadn’t change when compared to previous MRA’s it was appropriate to continue conservative follow up.  A follow up MRA from July 2017 demonstrated no changed when compared to the previous studies.  \par \par She suffered a fall from bed last month.  A work up at the time revealed small bilateral subdural hematomas and apparent enlargement of the right posterior communicating aneurysm.  After discussion in cerebrovascular conference it was decided that she would benefit from endovascular embolization of the right posterior communicating aneurysm.  We discussed the procedure, benefits and risks.  Given the wide necked shape of the aneurysm we will pre-medicate with aspirin and Plavix in case there is need for stenting at the time of coiling.  She has history of rash after aspirin intake, so she will be referred for possible aspirin desensitization.  She will also need premedication for history of allergy to contrast.  We will repeat a head CT to assess the previously seen subdural hemorrhage.\par \par I will keep you updated at all times.  Thank you for allowing us to participate in Mrs. Acosta’s care.\par \par Sincerely,\par \par \par Israel Cruz MD\par

## 2019-08-15 NOTE — ASSESSMENT
[FreeTextEntry1] : Patient suffered a fall from bed last month.  A work up at the time revealed small bilateral subdural hematomas and apparent enlargement of the right posterior communicating aneurysm.  After discussion in cerebrovascular conference it was decided that she would benefit from endovascular embolization of the right posterior communicating aneurysm.  We discussed the procedure, benefits and risks.  Given the wide necked shape of the aneurysm we will pre-medicate with aspirin and Plavix in case there is need for stenting at the time of coiling.  She has history of rash after aspirin intake, so she will be referred for possible aspirin desensitization.  She will also need premedication for history of allergy to contrast.  We will repeat a head CT to assess the previously seen subdural hemorrhage.\par \par Procedure is scheduled for  9/11/19. Patient and son were instructed the following:\par \par 1. Start taking baby aspirin 81mg daily, which is over the counter, and Plavix 75mg daily one week before procedure date. You will take these for at least 6 months.Take consistently between 7-8am. Side effects include increased bleeding.\par 2. We will check Accumetrics levels on the day of the procedure.\par 3. If taking a PPI like Pantoprazole for your stomach, please stop it when you start Plavix as this may affect the absorption of Plavix. Switch to H2 Antagonist like Zantac, which is over the counter.\par 4. Do not skip a dose of aspirin and Plavix as these will prevent the formation of clot around the stent. Do not let a provider stop aspirin and Plavix until they speak to us.\par \par \par Plans:\par 1. Obtain CT head to re-assess SDH before procedure. Instructed patient's son to call me when it's completed.\par 2. Diagnostic cerebral angiogram, possible coil embolization of right P-comm aneurysm with dual microcatheter, possible stent placement. Will start with ASA and Plavix one week before the procedure. Will check Accumetrics on the day of procedure.\par 3. Refer to Dr. Giron, allergist, for aspirin desensitization.\par 4. Rx Prednisone and Benadryl of contrast allergy

## 2019-08-16 NOTE — OCCUPATIONAL THERAPY INITIAL EVALUATION ADULT - LEVEL OF INDEPENDENCE: EATING, OT EVAL
Well adult  Patient appears to be in stable health    She will obtain blood work as ordered for further evaluation independent

## 2019-08-23 ENCOUNTER — OUTPATIENT (OUTPATIENT)
Dept: OUTPATIENT SERVICES | Facility: HOSPITAL | Age: 84
LOS: 1 days | End: 2019-08-23
Payer: MEDICARE

## 2019-08-23 ENCOUNTER — APPOINTMENT (OUTPATIENT)
Dept: CT IMAGING | Facility: HOSPITAL | Age: 84
End: 2019-08-23
Payer: MEDICARE

## 2019-08-23 PROCEDURE — 70450 CT HEAD/BRAIN W/O DYE: CPT

## 2019-08-23 PROCEDURE — 70450 CT HEAD/BRAIN W/O DYE: CPT | Mod: 26

## 2019-08-28 DIAGNOSIS — Z88.8 ALLERGY STATUS TO OTHER DRUGS, MEDICAMENTS AND BIOLOGICAL SUBSTANCES: ICD-10-CM

## 2019-08-28 RX ORDER — LORATADINE 10 MG/1
10 CAPSULE, LIQUID FILLED ORAL
Qty: 2 | Refills: 0 | Status: ACTIVE | COMMUNITY
Start: 2019-08-28 | End: 1900-01-01

## 2019-08-28 RX ORDER — MONTELUKAST 10 MG/1
10 TABLET, FILM COATED ORAL
Qty: 2 | Refills: 0 | Status: ACTIVE | COMMUNITY
Start: 2019-08-28 | End: 1900-01-01

## 2019-09-03 ENCOUNTER — TRANSCRIPTION ENCOUNTER (OUTPATIENT)
Age: 84
End: 2019-09-03

## 2019-09-03 ENCOUNTER — INPATIENT (INPATIENT)
Facility: HOSPITAL | Age: 84
LOS: 0 days | Discharge: HOME | DRG: 93 | End: 2019-09-03
Attending: RADIOLOGY | Admitting: RADIOLOGY
Payer: MEDICARE

## 2019-09-03 VITALS
HEART RATE: 70 BPM | DIASTOLIC BLOOD PRESSURE: 93 MMHG | SYSTOLIC BLOOD PRESSURE: 187 MMHG | RESPIRATION RATE: 22 BRPM | OXYGEN SATURATION: 96 %

## 2019-09-03 VITALS
RESPIRATION RATE: 27 BRPM | DIASTOLIC BLOOD PRESSURE: 70 MMHG | SYSTOLIC BLOOD PRESSURE: 140 MMHG | OXYGEN SATURATION: 98 % | HEART RATE: 72 BPM

## 2019-09-03 DIAGNOSIS — Z90.49 ACQUIRED ABSENCE OF OTHER SPECIFIED PARTS OF DIGESTIVE TRACT: Chronic | ICD-10-CM

## 2019-09-03 PROCEDURE — G0378: CPT

## 2019-09-03 PROCEDURE — 95180 RAPID DESENSITIZATION: CPT | Mod: GC

## 2019-09-03 RX ORDER — CLOPIDOGREL BISULFATE 75 MG/1
1 TABLET, FILM COATED ORAL
Qty: 30 | Refills: 0
Start: 2019-09-03 | End: 2019-10-02

## 2019-09-03 RX ORDER — EPINEPHRINE 0.3 MG/.3ML
0.3 INJECTION INTRAMUSCULAR; SUBCUTANEOUS ONCE
Refills: 0 | Status: DISCONTINUED | OUTPATIENT
Start: 2019-09-03 | End: 2019-09-03

## 2019-09-03 RX ORDER — CLOPIDOGREL BISULFATE 75 MG/1
75 TABLET, FILM COATED ORAL DAILY
Refills: 0 | Status: DISCONTINUED | OUTPATIENT
Start: 2019-09-04 | End: 2019-09-03

## 2019-09-03 RX ORDER — ACETAMINOPHEN 500 MG
650 TABLET ORAL EVERY 6 HOURS
Refills: 0 | Status: DISCONTINUED | OUTPATIENT
Start: 2019-09-03 | End: 2019-09-03

## 2019-09-03 RX ORDER — INFLUENZA VIRUS VACCINE 15; 15; 15; 15 UG/.5ML; UG/.5ML; UG/.5ML; UG/.5ML
0.5 SUSPENSION INTRAMUSCULAR ONCE
Refills: 0 | Status: COMPLETED | OUTPATIENT
Start: 2019-09-03 | End: 2019-09-03

## 2019-09-03 RX ORDER — ASPIRIN/CALCIUM CARB/MAGNESIUM 324 MG
325 TABLET ORAL ONCE
Refills: 0 | Status: COMPLETED | OUTPATIENT
Start: 2019-09-03 | End: 2019-09-03

## 2019-09-03 RX ORDER — DIPHENHYDRAMINE HCL 50 MG
50 CAPSULE ORAL ONCE
Refills: 0 | Status: DISCONTINUED | OUTPATIENT
Start: 2019-09-03 | End: 2019-09-03

## 2019-09-03 RX ORDER — HYDRALAZINE HCL 50 MG
10 TABLET ORAL
Refills: 0 | Status: DISCONTINUED | OUTPATIENT
Start: 2019-09-03 | End: 2019-09-03

## 2019-09-03 RX ORDER — SODIUM CHLORIDE 9 MG/ML
1000 INJECTION INTRAMUSCULAR; INTRAVENOUS; SUBCUTANEOUS
Refills: 0 | Status: DISCONTINUED | OUTPATIENT
Start: 2019-09-03 | End: 2019-09-03

## 2019-09-03 RX ORDER — HYDRALAZINE HCL 50 MG
5 TABLET ORAL
Refills: 0 | Status: DISCONTINUED | OUTPATIENT
Start: 2019-09-03 | End: 2019-09-03

## 2019-09-03 RX ORDER — ASPIRIN/CALCIUM CARB/MAGNESIUM 324 MG
1 TABLET ORAL
Qty: 30 | Refills: 0
Start: 2019-09-03 | End: 2019-10-02

## 2019-09-03 RX ORDER — ASPIRIN/CALCIUM CARB/MAGNESIUM 324 MG
325 TABLET ORAL ONCE
Refills: 0 | Status: DISCONTINUED | OUTPATIENT
Start: 2019-09-03 | End: 2019-09-03

## 2019-09-03 RX ORDER — HYDRALAZINE HCL 50 MG
5 TABLET ORAL ONCE
Refills: 0 | Status: COMPLETED | OUTPATIENT
Start: 2019-09-03 | End: 2019-09-03

## 2019-09-03 RX ADMIN — Medication 5 MILLIGRAM(S): at 10:15

## 2019-09-03 RX ADMIN — Medication 5 MILLIGRAM(S): at 11:41

## 2019-09-03 RX ADMIN — Medication 325 MILLIGRAM(S): at 12:49

## 2019-09-03 RX ADMIN — SODIUM CHLORIDE 50 MILLILITER(S): 9 INJECTION INTRAMUSCULAR; INTRAVENOUS; SUBCUTANEOUS at 10:35

## 2019-09-03 NOTE — CHART NOTE - NSCHARTNOTEFT_GEN_A_CORE
ASPIRIN DESENSITIZATION    PATIENT REPORTELY HAS ASA -INDUCED: __hives /angioedema______________________  NO HISTORY OF ASA INDUCED ANYPHYLACTOID (SYNCOPE, HYPOTENSION, NAUSEA/VOMITING)  OR RESPIRATORY DISEASE (ASTHMA, RHINITIS, POLYPS)    pt has been premedicated with loratidine and montelukast  all beta blockers and ace inhibitors have been held for >24hrs      subjective/  currently pt DENIES:  Increased nasal congestion or stuffiness  Watery, itchy, or red eyes  Frontal headache or sensation of sinus pain  Headache or facial pain/pressure  Cough, wheezing, or “tightness” in the chest     Informed consent was obtained and time-out was performed.    time                  symptoms/signs:  0 min       = no reaction   with 5mg asa  15 min     = no reaction with 10mg asa  30 min     = no reaction with  20mg asa  45 min    =  no reaction with 40mg asa  60 min   =   no reaction with 80 mg asa  90 min   =   no reaction with 160 mg  120mg = no reaction with 325mg tab    1 hr post tablet       ICU Vital Signs Last 24 Hrs  T(C): 37 (03 Sep 2019 13:00), Max: 37 (03 Sep 2019 13:00)  T(F): 98.6 (03 Sep 2019 13:00), Max: 98.6 (03 Sep 2019 13:00)  HR: 74 (03 Sep 2019 14:30) (64 - 80)  BP: 159/82 (03 Sep 2019 14:30) (145/73 - 190/76)  BP(mean): 97 (03 Sep 2019 14:30) (89 - 141)  ABP: --  ABP(mean): --  RR: 23 (03 Sep 2019 14:30) (15 - 46)  SpO2: 99% (03 Sep 2019 14:30) (92% - 100%)      Constitutional: well appearing  Eyes: non injected  ENMT: no lip/tongue or facial edema  Neck: no stridor   Respiratory:cta bl  Cardiovascular: s1 s2 nml, rrr  Gastrointestinal: soft (+) bs nt nd   Extremities: no edema  Skin:no rash, urticaria       Procedure was performed successfully without complications.  * ASPIRIN DESENSITIZATION HAS BEEN SUCCESSFUL. PATIENT CAN BE STARTED ON ASA,  __325____MG DAILY  *IF 2 DOSES OF ASA ARE MISSED, PATIENT WILL NEED TO GO THROUGH ASA DESENSITIZATION AGAIN.     Results were explained to the patient and communicated with primary team  Procedure was performed by Dr. Jf Giron  Time face to face 210 minutes with >50% on counseling and coordination of care.

## 2019-09-03 NOTE — PATIENT PROFILE ADULT - STATED REASON FOR ADMISSION
Patient had a fall OOB in the middle of the night 6/9/2019. Daughter had to wake patient up. Patient then went out for the day and started developing headaches which she describes as being "different". Pain described as being sharp and sudden.  Patient had a history of migraines but sought out Dr. Cruz because the quality of the headache was different.  Patient went for CT scan of the head at that time and there was blood noted and multiple aneurysms noted.  Patient is a direct admit from home for Aspirin Desensitization

## 2019-09-03 NOTE — H&P ADULT - NSHPPOADEEPVENOUSTHROMB_GEN_A_CORE
FYI: TCM completed. NCM attempted to schedule TCM/HFU, patient declined will call office. TE to PCP office re: appointment. Thank you. no

## 2019-09-03 NOTE — PROGRESS NOTE ADULT - SUBJECTIVE AND OBJECTIVE BOX
***NEURO-ICU ATTENDING NOTE***  HPI  83 y/o F with PMHx of cerebral aneurysms (followed by neurosurgeon Dr. Romero) presents with stable cerebral aneurysms here for aspirin desensitization prior to planned intravascular therapy.  Initially diagnosed incidentally after a fall in July.  Has R supraclinoid ICA at pcomm (6mm).  Also has a R A2 2.5mm, and Acomm 2.5mm, and distal R M1 <2mm.      PMH: none other    Allergies: rash to ASA    Allergies: aspirin (Angioedema)  iodine (Anaphylaxis)  Mucomyst (Rash)  Naprosyn (Anaphylaxis)  penicillins (Anaphylaxis)          VITALS:  T(C): --  HR: 70 (09-03-19 @ 08:00) (70 - 70)  BP: 187/93 (09-03-19 @ 08:00) (187/93 - 187/93)  RR: 22 (09-03-19 @ 08:00) (22 - 22)  SpO2: 96% (09-03-19 @ 08:00) (96% - 96%)    EXAM:        MEDICATIONS:  acetaminophen   Tablet .. 650 milliGRAM(s) Oral every 6 hours PRN  aspirin 325 milliGRAM(s) Oral once  aspirin 325 milliGRAM(s) Oral once  Aspirin 10mg/ml solution 10 milliGRAM(s) 10 milliGRAM(s) Oral once  Aspirin 10mg/ml solution 160 milliGRAM(s)   Oral once  Aspirin 10mg/ml solution 20 milliGRAM(s) 20 milliGRAM(s) Oral once  Aspirin 10mg/ml solution 40 milliGRAM(s)   Oral once  Aspirin 10mg/ml solution 5 milliGRAM(s) 5 milliGRAM(s) Oral once  Aspirin 10mg/ml solution 80 milliGRAM(s)   Oral once  diphenhydrAMINE   Injectable 50 milliGRAM(s) IV Push once PRN  EPINEPHrine     1 mG/mL Injectable 0.3 milliGRAM(s) IntraMuscular once PRN  methylPREDNISolone sodium succinate Injectable 125 milliGRAM(s) IV Push once PRN    Labs: no new labs ***NEURO-ICU ATTENDING NOTE***  HPI  83 y/o F with PMHx of cerebral aneurysms (followed by neurosurgeon Dr. Romero) presents with stable cerebral aneurysms here for aspirin desensitization prior to planned intravascular therapy.  Initially diagnosed incidentally after a fall in July.  Has R supraclinoid ICA at pcomm (6mm).  Also has a R A2 2.5mm, and Acomm 2.5mm, and distal R M1 <2mm.      PMH: none other    Allergies: Per patient, has diffuse itching and rash and lip swelling to both aspirin and iodine contrast.  Also has unclear allergy to PCN and some NSAIDS.    Allergies: aspirin (Angioedema)  iodine (Anaphylaxis)  Mucomyst (Rash)  Naprosyn (Anaphylaxis)  penicillins (Anaphylaxis)          VITALS:  T(C): --  HR: 70 (09-03-19 @ 08:00) (70 - 70)  BP: 187/93 (09-03-19 @ 08:00) (187/93 - 187/93)  RR: 22 (09-03-19 @ 08:00) (22 - 22)  SpO2: 96% (09-03-19 @ 08:00) (96% - 96%)    EXAM:  Gen: anitmated elderly woman in NAD  Pulm: CTA b/l  MSE: awake, alert, oriented x3, follows multistep commands, attention normal, language fluent with intact naming and repetition  CN: JOAN, EOMI, VFF, face symmetric, TUP midline, no dysarthria  Motor: no pronator drift, full strength to confrontation in all extremities  Sensory: intact to LT throughout       MEDICATIONS:  acetaminophen   Tablet .. 650 milliGRAM(s) Oral every 6 hours PRN  aspirin 325 milliGRAM(s) Oral once  aspirin 325 milliGRAM(s) Oral once  Aspirin 10mg/ml solution 10 milliGRAM(s) 10 milliGRAM(s) Oral once  Aspirin 10mg/ml solution 160 milliGRAM(s)   Oral once  Aspirin 10mg/ml solution 20 milliGRAM(s) 20 milliGRAM(s) Oral once  Aspirin 10mg/ml solution 40 milliGRAM(s)   Oral once  Aspirin 10mg/ml solution 5 milliGRAM(s) 5 milliGRAM(s) Oral once  Aspirin 10mg/ml solution 80 milliGRAM(s)   Oral once  diphenhydrAMINE   Injectable 50 milliGRAM(s) IV Push once PRN  EPINEPHrine     1 mG/mL Injectable 0.3 milliGRAM(s) IntraMuscular once PRN  methylPREDNISolone sodium succinate Injectable 125 milliGRAM(s) IV Push once PRN    Labs: no new labs

## 2019-09-03 NOTE — H&P ADULT - ASSESSMENT
preop for antiplatelets desnsitization  orders per Dr. Mack almanzar for anaphylaxis  d/w Dr. Cruz and Roel 83 y/o female pm hx colon ca with multiple intracranial aneurysms preop for antiplatelets desensitization today  medication orders for desensitization per Dr. Giron  monitor closely for anaphylaxis  d/w Dr. Cruz and Roel

## 2019-09-03 NOTE — H&P ADULT - HISTORY OF PRESENT ILLNESS
83 y/o F with PMHx of cerebral aneurysms (followed by neurosurgeon Dr. Romero) presents with stable cerebral aneurysms here for aspirin desensitization. Martha was recently admitted in July after a fall. Pt denies any headaches, forgetfulness and gait instability. Pt denies nausea, vomiting, acute weakness/loss of sensation of extremities, dysarthria, dysphagia, urinary/bowel incontinence, neck pain, fever. 83 y/o F with PMHx of cerebral aneurysms (followed by neurosurgeon Dr. Romero) presents with stable cerebral aneurysms here for aspirin desensitization. Martha was recently admitted in July after a fall. Pt denies any headaches, forgetfulness and gait instability. Pt denies nausea, vomiting, acute weakness/loss of sensation of extremities, dysarthria, dysphagia, urinary/bowel incontinence, neck pain, fever. Pt reports itching and rash with asa and iodine contrast.

## 2019-09-03 NOTE — PROGRESS NOTE ADULT - ASSESSMENT
85 y/o F with PMHx of cerebral aneurysms here for aspirin desensitization prior to planned intravascular therapy.  Appears to have anaphylaxis vs angioedema as her reaction.    Plan by problem)   1) Desensitization   -protocol per Dr. Giron, pretreat before ASA  -Q1h vitals, monitor respiratory and airway closely, AMBU bag in room  -anaphylaxis preparations    2) Neuro - ASA start today with sensitization  -start plavix 75 tomorrow per Dr. Cruz    3) due to short planned stay and ambulatory, no indication for DVT prophylaxis    4) GI - keep NPO, IVF's at 50    Jamil Cramer MD  Attending Neurointensivist 85 y/o F with PMHx of cerebral aneurysms here for aspirin desensitization prior to planned intravascular therapy.  Appears to have anaphylaxis vs angioedema as her reaction.    Plan by problem)   1) Desensitization   -protocol per Dr. Giron, pretreat before ASA  -Q1h vitals, monitor respiratory and airway closely for any evidence of anaphylaxis, AMBU bag in room  -anaphylaxis preparations    2) Neuro - ASA start today with sensitization  -start plavix 75 tomorrow per Dr. Cruz    3) due to short planned stay and ambulatory, no indication for DVT prophylaxis    4) GI - keep NPO, IVF's at 50    Jamil Cramer MD  Attending Neurointensivist

## 2019-09-03 NOTE — DISCHARGE NOTE PROVIDER - NSDCFUSCHEDAPPT_GEN_ALL_CORE_FT
MITCHELL HINOJOSA ; 09/27/2019 ; [facility] Neuro[facility] [address] [place] [address] MITCHELL HINOJOSA ; 09/27/2019 ; [facility] Neuros[facility] [address] [place] [address]

## 2019-09-03 NOTE — DISCHARGE NOTE PROVIDER - HOSPITAL COURSE
Patient admitted for aspirin desensitization protocol in preparation for cerebral angiogram and treatment of multiple cerebral aneurysms. Dr. Giron consulted for administer medications. Patient monitored closely in CCU without any issues. Patient remains stable to discharge to home instructed to follow up with Dr. Cruz for planned procedure next week. Patient admitted for aspirin desensitization protocol in preparation for cerebral angiogram and treatment of multiple cerebral aneurysms. Dr. Giron consulted for administer medications. Patient monitored closely in CCU without any issues. Patient remains stable to discharge to home instructed to follow up with Dr. Cruz for planned procedure next week. Patient will remain on asa 325mg and plavix 75mg leading up to her procedure next week.

## 2019-09-03 NOTE — PROGRESS NOTE ADULT - ATTENDING COMMENTS
ATTENDING ATTESTATION:  I was physically present for the key portions of the evaluation and management (E/M) service provided.  I agree with the above history, physical and plan, which I have reviewed and edited where appropriate.  Patient at high risk for neurological deterioration or death due to:  anaphylaxis during sensitization    Critical care time:  I have personally provided 35 minutes of critical care time, excluding time spent on separate procedures.      Plan discussed with RN, PA team.

## 2019-09-03 NOTE — DISCHARGE NOTE NURSING/CASE MANAGEMENT/SOCIAL WORK - PATIENT PORTAL LINK FT
You can access the FollowMyHealth Patient Portal offered by [facility] by registering at the following website: [email or web address]. By joining [facility]’s FollowMyHealth portal, you will also be able to view your health information using other applications (apps) compatible with our system.

## 2019-09-03 NOTE — H&P ADULT - NSHPPHYSICALEXAM_GEN_ALL_CORE
A&O x 3, comfortable   EOMI, PERRL  face symmetric  no focal deficits A&O x 3, comfortable   EOMI, PERRL  face symmetric  no focal deficits  RRR  ctas b/l

## 2019-09-03 NOTE — H&P ADULT - NSHPLABSRESULTS_GEN_ALL_CORE
n/a 1-2. Right supraclinoid internal carotid artery aneurysm, at P-comm,   measures maximum 6 mm in diameter. The base of the aneurysm is 5 mm,   compared to 3 mm on the 2017 study. The posterior communicating artery   projects at the base of the aneurysm and shows medially oriented   infundibular dilatation (2.5 mm diameter, stable) with more normal   caliber as it courses posteriorly.    3. Right A2 2.5 mm aneurysm is stable.    4. A-comm 2.5 mm aneurysm is stable.    5. A sub-2 mm right distal M1 aneurysm is stable.    6. Complex left proximal-mid M1 aneurysm is stable, 3 mm base, with two   approximately 2-3 mm outpouchings.

## 2019-09-03 NOTE — DISCHARGE NOTE PROVIDER - NSDCACTIVITY_GEN_ALL_CORE
No restrictions/No heavy lifting/straining/Walking - Outdoors allowed/Stairs allowed/Showering allowed/Bathing allowed/Walking - Indoors allowed

## 2019-09-03 NOTE — H&P ADULT - NSICDXPASTSURGICALHX_GEN_ALL_CORE_FT
PAST SURGICAL HISTORY:  No significant past surgical history PAST SURGICAL HISTORY:  History of bowel resection

## 2019-09-03 NOTE — H&P ADULT - NSICDXPASTMEDICALHX_GEN_ALL_CORE_FT
PAST MEDICAL HISTORY:  Intracranial aneurysm PAST MEDICAL HISTORY:  Colon cancer     Intracranial aneurysm

## 2019-09-03 NOTE — DISCHARGE NOTE PROVIDER - CARE PROVIDER_API CALL
Israel Cruz)  Neurology; Vascular Neurology  [address] [place] [address]  Phone: [phone]  Fax: [phone]  Follow Up Time:

## 2019-09-05 PROBLEM — C18.9 MALIGNANT NEOPLASM OF COLON, UNSPECIFIED: Chronic | Status: ACTIVE | Noted: 2019-09-03

## 2019-09-11 ENCOUNTER — OUTPATIENT (OUTPATIENT)
Dept: OUTPATIENT SERVICES | Facility: HOSPITAL | Age: 84
LOS: 1 days | Discharge: HOME | End: 2019-09-11
Payer: MEDICARE

## 2019-09-11 DIAGNOSIS — Z90.49 ACQUIRED ABSENCE OF OTHER SPECIFIED PARTS OF DIGESTIVE TRACT: Chronic | ICD-10-CM

## 2019-09-11 LAB
ANION GAP SERPL CALC-SCNC: 11 MMOL/L — SIGNIFICANT CHANGE UP (ref 5–17)
APTT BLD: 26.3 SEC — LOW (ref 27.5–36.3)
BASOPHILS # BLD AUTO: 0 K/UL — SIGNIFICANT CHANGE UP (ref 0–0.2)
BASOPHILS NFR BLD AUTO: 0 % — SIGNIFICANT CHANGE UP (ref 0–2)
BUN SERPL-MCNC: 18 MG/DL — SIGNIFICANT CHANGE UP (ref 7–23)
CALCIUM SERPL-MCNC: 9.3 MG/DL — SIGNIFICANT CHANGE UP (ref 8.4–10.5)
CHLORIDE SERPL-SCNC: 105 MMOL/L — SIGNIFICANT CHANGE UP (ref 96–108)
CO2 SERPL-SCNC: 23 MMOL/L — SIGNIFICANT CHANGE UP (ref 22–31)
CREAT SERPL-MCNC: 0.73 MG/DL — SIGNIFICANT CHANGE UP (ref 0.5–1.3)
EOSINOPHIL # BLD AUTO: 0 K/UL — SIGNIFICANT CHANGE UP (ref 0–0.5)
EOSINOPHIL NFR BLD AUTO: 0 % — SIGNIFICANT CHANGE UP (ref 0–6)
GLUCOSE SERPL-MCNC: 148 MG/DL — HIGH (ref 70–99)
HCT VFR BLD CALC: 36.6 % — SIGNIFICANT CHANGE UP (ref 34.5–45)
HGB BLD-MCNC: 12.3 G/DL — SIGNIFICANT CHANGE UP (ref 11.5–15.5)
INR BLD: 1.11 — SIGNIFICANT CHANGE UP (ref 0.88–1.16)
LYMPHOCYTES # BLD AUTO: 0.77 K/UL — LOW (ref 1–3.3)
LYMPHOCYTES # BLD AUTO: 9.6 % — LOW (ref 13–44)
MCHC RBC-ENTMCNC: 31.1 PG — SIGNIFICANT CHANGE UP (ref 27–34)
MCHC RBC-ENTMCNC: 33.6 GM/DL — SIGNIFICANT CHANGE UP (ref 32–36)
MCV RBC AUTO: 92.7 FL — SIGNIFICANT CHANGE UP (ref 80–100)
MONOCYTES # BLD AUTO: 0 K/UL — SIGNIFICANT CHANGE UP (ref 0–0.9)
MONOCYTES NFR BLD AUTO: 0 % — LOW (ref 2–14)
NEUTROPHILS # BLD AUTO: 7.12 K/UL — SIGNIFICANT CHANGE UP (ref 1.8–7.4)
NEUTROPHILS NFR BLD AUTO: 86.8 % — HIGH (ref 43–77)
PLATELET # BLD AUTO: 236 K/UL — SIGNIFICANT CHANGE UP (ref 150–400)
POTASSIUM SERPL-MCNC: 4 MMOL/L — SIGNIFICANT CHANGE UP (ref 3.5–5.3)
POTASSIUM SERPL-SCNC: 4 MMOL/L — SIGNIFICANT CHANGE UP (ref 3.5–5.3)
PROTHROM AB SERPL-ACNC: 12.6 SEC — SIGNIFICANT CHANGE UP (ref 10–12.9)
RBC # BLD: 3.95 M/UL — SIGNIFICANT CHANGE UP (ref 3.8–5.2)
RBC # FLD: 14.3 % — SIGNIFICANT CHANGE UP (ref 10.3–14.5)
SODIUM SERPL-SCNC: 139 MMOL/L — SIGNIFICANT CHANGE UP (ref 135–145)
WBC # BLD: 8.04 K/UL — SIGNIFICANT CHANGE UP (ref 3.8–10.5)
WBC # FLD AUTO: 8.04 K/UL — SIGNIFICANT CHANGE UP (ref 3.8–10.5)

## 2019-09-11 PROCEDURE — 36223 PLACE CATH CAROTID/INOM ART: CPT | Mod: RT

## 2019-09-11 PROCEDURE — 76377 3D RENDER W/INTRP POSTPROCES: CPT | Mod: 26

## 2019-09-11 PROCEDURE — 36225 PLACE CATH SUBCLAVIAN ART: CPT | Mod: RT

## 2019-09-11 RX ORDER — SODIUM CHLORIDE 9 MG/ML
1000 INJECTION INTRAMUSCULAR; INTRAVENOUS; SUBCUTANEOUS
Refills: 0 | Status: DISCONTINUED | OUTPATIENT
Start: 2019-09-11 | End: 2019-09-11

## 2019-09-11 RX ORDER — CHLORHEXIDINE GLUCONATE 213 G/1000ML
1 SOLUTION TOPICAL ONCE
Refills: 0 | Status: DISCONTINUED | OUTPATIENT
Start: 2019-09-11 | End: 2019-09-11

## 2019-09-11 NOTE — PROGRESS NOTE ADULT - SUBJECTIVE AND OBJECTIVE BOX
Neuroendovascular Surgery Pre Proc Note  83 y/o F with PMHx of multiple cerebral aneurysms has been conservatively followed with non invasive imagin that recently suggested enlargement of right posterior communicating artery aneurysm with remainder aneurysms stable. Patient underwent aspirin desensitization to aspirin last week and was continue on it along with plavix for planned neuroendovascular coil embolization of aneurysm and possible stent presents with stable cerebral aneurysms here for aspirin desensitization. Patient was recently admitted in July after a fall. Pt denies any headaches, forgetfulness and gait instability. Pt denies nausea, vomiting, acute weakness/loss of sensation of extremities, dysarthria, dysphagia, urinary/bowel incontinence, neck pain, fever.     PMHx; As above, also h/o colon ca 3 yrs ago    PSHx: Abd surgery for colon cancer, APPy           Cataract surgery bilaterally    Meds: Aspirin 325 mg po daily, plavix 75 mg po daily    All: Naprosyn, Iodine ASA, PCN, gluten    Labs reviewed and ok.  Patient desensitized to aspirin and currently on 325 mg daily, she was also premedicated for Iodine allergy.    Exam;   Gneral; Anxious, but in NAD  HEENT: NC/AT, PERRLA, EOMI, neck supple, no JVD, FROM CN 2-12 intact  Motor: 5/5 x4, SILT  Heart: S1-S2, RRR  Lungs: CTA b/l  Abd; Soft. NT/ND  Ext: No C/C/E, no calf tenderness  LE pulses: 1+    Proc, risks, benefits and alternatives were discussed, all questions were answered and consent was signed. Neuroendovascular Surgery Pre Proc Note  83 y/o F with PMHx of multiple cerebral aneurysms has been conservatively followed with non invasive imaging that recently suggested enlargement of right posterior communicating artery aneurysm with remainder aneurysms stable. Patient underwent aspirin desensitization to aspirin last week and was continue on it along with plavix for planned neuroendovascular coil embolization of aneurysm and possible stent  Pt denies any headaches, forgetfulness and gait instability. Pt denies nausea, vomiting, acute weakness/loss of sensation of extremities, dysarthria, dysphagia, urinary/bowel incontinence, neck pain, fever.     PMHx; As above, also h/o colon ca 3 yrs ago    PSHx: Abd surgery for colon cancer, APPy           Cataract surgery bilaterally    Meds: Aspirin 325 mg po daily, plavix 75 mg po daily    All: Naprosyn, Iodine ASA, PCN, gluten    Labs reviewed and ok.  Patient desensitized to aspirin and currently on 325 mg daily, she was also premedicated for Iodine allergy.    Exam;   Gneral; Anxious, but in NAD  HEENT: NC/AT, PERRLA, EOMI, neck supple, no JVD, FROM CN 2-12 intact  Motor: 5/5 x4, SILT  Heart: S1-S2, RRR  Lungs: CTA b/l  Abd; Soft. NT/ND  Ext: No C/C/E, no calf tenderness  LE pulses: 1+    Proc, risks, benefits and alternatives were discussed, all questions were answered and consent was signed. Neuroendovascular Surgery Pre Proc Note  83 y/o F with PMHx of multiple cerebral aneurysms has been conservatively followed with non invasive imaging that recently suggested enlargement of right posterior communicating artery aneurysm with remainder aneurysms stable. Patient underwent desensitization to aspirin last week and was continue on it along with plavix for planned neuroendovascular coil embolization of aneurysm and possible stent  Pt denies any headaches, forgetfulness and gait instability. Pt denies nausea, vomiting, acute weakness/loss of sensation of extremities, dysarthria, dysphagia, urinary/bowel incontinence, neck pain, fever.     PMHx; As above, also h/o colon ca 3 yrs ago    PSHx: Abd surgery for colon cancer, APPy           Cataract surgery bilaterally    Meds: Aspirin 325 mg po daily, plavix 75 mg po daily       All: Naprosyn, Iodine ASA, PCN, gluten    Labs reviewed and ok.  Patient desensitized to aspirin and currently on 325 mg daily, she was also premedicated for Iodine allergy.    Exam;   Gneral; Anxious, but in NAD  HEENT: NC/AT, PERRLA, EOMI, neck supple, no JVD, FROM CN 2-12 intact  Motor: 5/5 x4, SILT  Heart: S1-S2, RRR  Lungs: CTA b/l  Abd; Soft. NT/ND  Ext: No C/C/E, no calf tenderness  LE pulses: 1+    Patient was premedicated for contrast allergy  Proc, risks, benefits and alternatives were discussed, all questions were answered and consent was signed.

## 2019-09-13 DIAGNOSIS — Z51.6 ENCOUNTER FOR DESENSITIZATION TO ALLERGENS: ICD-10-CM

## 2019-09-13 DIAGNOSIS — I67.1 CEREBRAL ANEURYSM, NONRUPTURED: ICD-10-CM

## 2019-09-13 DIAGNOSIS — Z91.041 RADIOGRAPHIC DYE ALLERGY STATUS: ICD-10-CM

## 2019-09-13 DIAGNOSIS — Z88.0 ALLERGY STATUS TO PENICILLIN: ICD-10-CM

## 2019-09-13 DIAGNOSIS — Z85.038 PERSONAL HISTORY OF OTHER MALIGNANT NEOPLASM OF LARGE INTESTINE: ICD-10-CM

## 2019-09-13 DIAGNOSIS — Z88.6 ALLERGY STATUS TO ANALGESIC AGENT: ICD-10-CM

## 2019-09-13 RX ORDER — ASPIRIN 81 MG/1
81 TABLET, CHEWABLE ORAL
Refills: 0 | Status: ACTIVE | COMMUNITY

## 2019-09-13 RX ORDER — CLOPIDOGREL BISULFATE 75 MG/1
75 TABLET, FILM COATED ORAL
Qty: 30 | Refills: 1 | Status: DISCONTINUED | COMMUNITY
Start: 2019-08-15 | End: 2019-09-13

## 2019-09-13 RX ORDER — ASPIRIN 325 MG/1
325 TABLET, FILM COATED ORAL
Refills: 0 | Status: DISCONTINUED | COMMUNITY
End: 2019-09-13

## 2019-09-27 ENCOUNTER — APPOINTMENT (OUTPATIENT)
Dept: NEUROSURGERY | Facility: CLINIC | Age: 84
End: 2019-09-27

## 2019-10-04 ENCOUNTER — APPOINTMENT (OUTPATIENT)
Dept: NEUROSURGERY | Facility: CLINIC | Age: 84
End: 2019-10-04

## 2019-10-15 PROCEDURE — C1757: CPT

## 2019-10-15 PROCEDURE — C1894: CPT

## 2019-10-15 PROCEDURE — C1887: CPT

## 2019-10-15 PROCEDURE — 85025 COMPLETE CBC W/AUTO DIFF WBC: CPT

## 2019-10-15 PROCEDURE — 36415 COLL VENOUS BLD VENIPUNCTURE: CPT

## 2019-10-15 PROCEDURE — C9399: CPT

## 2019-10-15 PROCEDURE — 85610 PROTHROMBIN TIME: CPT

## 2019-10-15 PROCEDURE — C1769: CPT

## 2019-10-15 PROCEDURE — 80048 BASIC METABOLIC PNL TOTAL CA: CPT

## 2019-10-15 PROCEDURE — 36223 PLACE CATH CAROTID/INOM ART: CPT

## 2019-10-15 PROCEDURE — 85730 THROMBOPLASTIN TIME PARTIAL: CPT

## 2019-10-15 PROCEDURE — 85576 BLOOD PLATELET AGGREGATION: CPT

## 2019-10-15 PROCEDURE — C1760: CPT
